# Patient Record
Sex: MALE | Race: WHITE | NOT HISPANIC OR LATINO | Employment: OTHER | URBAN - METROPOLITAN AREA
[De-identification: names, ages, dates, MRNs, and addresses within clinical notes are randomized per-mention and may not be internally consistent; named-entity substitution may affect disease eponyms.]

---

## 2024-07-30 ENCOUNTER — EDUCATION (OUTPATIENT)
Dept: ORTHOPEDIC SURGERY | Facility: HOSPITAL | Age: 74
End: 2024-07-30
Payer: MEDICARE

## 2024-07-30 NOTE — GROUP NOTE
In addition to the group class activities, Jose Garrett had the following lab work completed:  No orders of the defined types were placed in this encounter.      A new History and Physical was not completed.    This class lasted approximately 2 hours. The nurse instructor covered the following topics:  Overview of joint replacement surgery.  Instructions for at-home preparation for surgery (included below).  Expectations before and after surgery including hospital stay.  Discharge planning and home care options.  Physical therapy overview and review of at-home exercises.    Thank you for attending our Joint Replacement class today in preparation for your upcoming surgery.  Topics discussed include:    MyChart Enrollment  Communication with Care Team  My Chart is the best form of communication to reach all of your caregivers  You can send messages to specific care givers, or a care team  Continued Education  You will be enrolled in a Total Joint Replacement care plan to receive additional education before and after surgery  You can review a short recording of the class content  Access to Medical Records  You can access test results, office notes, appointments, etc.  You can connect to other healthcare systems who use [employer] ([facility], [facility], [facility], etc.)  [place]  Program Information  Consent to Enroll    Background/Understanding of Joint Replacement Surgery  Potential for same day discharge  Any questions or concerns to be directed to the surgeon's office    How to Prepare for Surgery  Use of Nicotine Products/Smoking  Stop several weeks before surgery  Such products slow down the healing process and increase risk of post-op infection and complications  Clearance for Surgery  Medical Clearance by Specialists  Dental Clearance  Cracked/Broken/Loose teeth left untreated may postpone surgery  The importance of post-op antibiotics for dental visits per surgeon protocol  Preadmission Testing  **Potential for  postponed surgery if appropriate clearance is not obtained  Medication Instruction  Follow instructions provided by the doctor who prescribes your medication (typically, but not limited to cardiologist)  Preadmission testing will provide additional instructions during your appointment on what to stop and what to take as you get closer to surgery  For clarification of these instructions, please call preadmission testing directly - [phone]  Tips for Preparing the home for discharge from the hospital  Care Partner  Requirement for surgery, the patient must have a plan to have help at home  Potential for postponed surgery if plan for home support cannot be established  How the care partner can help after surgery  CHG Body Wash/Mouth Wash  Follow the instructions given at preadmission testing  Body wash is to be used on the body and hair for 5 washes  Mouthwash is to be used the night before and morning of surgery  **This is a system-wide protocol developed by infectious disease professionals, we will not alter our recommendations for those with sensitive skin or those who have special hair needs.  Please follow the instructions as they are written as this will provide the best infection prevention measures for surgery.  Should you have an allergy to one of the products, please discuss with your preadmission team**    What to Expect in the Hospital/At Home  Morning of Surgery NPO Guidelines  Nothing to eat after midnight  Water can be consumed up to 2 hours prior to arrival  Surgical and Post-Surgical Care Team  Surgical Team  Anesthesia Team  Nursing  Physical Therapy  Care Coordinating  Pharmacy  Hospital Arrival Instructions  Arrive at the time provided to you  Consider traffic patterns (rush-hour) based on arrival time  Have arrangements made for a ride home  If discharging same day, care partner should remain at the hospital  Recovering after Surgery  Recovery Room - Visitors are not brought back  Transition to  hospital room - 2nd Floor, Visitors will be directed to your room  The presence of and strategies for controlling surgical pain and swelling  The importance of early mobility  Side effects after surgery  What to expect if staying overnight    Discharge Planning  The intended plan for discharge will be for patients to discharge home  All patients require a care partner (family, friend, neighbor, etc.) to stay with the patient for the first few nights after surgery  The inability to secure help at home will postpone surgery  Home Care Services set up per surgeon order  Physical Therapy  Occupational Therapy  **If desired, private duty care can be arranged by the patient ahead of time**  Outpatient Physical Therapy per surgeon order    Recovering at Home  Wound Care  Follow wound care instructions found in your discharge paperwork  Bandage is water-resistant and you may shower with the bandage  Do not scrub directly over the bandage  Do not submerge in water until cleared (bathtub, hot tub, pool, etc.)    Post-Op Risk Prevention  Infection Prevention  Promptly seek treatment for any infections post-operatively  Routine dental visits must be postponed for 3 months after surgery  Your surgeon may require antibiotics prior to future dental visits  Any concerns for infection not related directly to the knee or the hip should be managed by your primary care provider  Blood Clots  Be sure to complete the course of blood thinning medication as prescribed by your surgeon  Movement every 1-2 hours during the day is encouraged to prevent blood clots  Monitor for signs of blood clots  Wear compression stockings as prescribed by your surgeon  Constipation  Constipation is common following surgery  Drink plenty of fluids  Take stool softener/laxative as prescribed by your surgeon  Move around frequently  Eat foods high in fiber  Fall Prevention  Prepare home ahead of time to clear space to move with walker  Remove throw rugs and  electrical cords from walkways  Install railings near any stairways with more than 2 steps  Use night lights for increased visibility at night  Continue to use your assistive device until cleared by surgeon or physical therapy  Dislocation Prevention - Not all procedures will have dislocation precautions  Follow dislocation precautions provided by your surgeon  It is OK to resume sexual activity about 6 weeks following surgery  Be sure to follow any dislocation precautions assigned    Durable Medical Equipment  Cold Therapy  Breg Cold Therapy Machines  Ice/Gel Packs  Assistive Devices  Folding Walker with Wheels (in the front only)  No Rollators  Crutches if approved by Physical Therapy and Surgeon after surgery  Hip Kits  Raised Toilet Seats  Additional Compression Stockings    Joint Preservation  Healthy Activities when Cleared  Walking  Swimming  Bike Riding  Activities to Avoid  Refrain from repetitive motions which have a high impact on the joint  Gradual Progression  Progress activity slowly, listen to your body  Common Findings - NORMAL after surgery  Clicking/Grinding  Numbness near incision    Physical Therapy  Prehabilitation exercises  START TODAY ON BOTH LEGS  Surgery Specific Precautions  Follow surgery specific precautions found in your discharge paperwork    Follow-Up Visit  All patients will see their surgeon for a follow up visit after surgery  The visit may range from 2-6 weeks after surgery and is surgeon specific      Please don't hesitate to reach out if you have any additional questions or concerns.    Hina Granados MBA, BSN, RN-BC  MINA Foley, RN  [facility]  [facility]   [phone]

## 2024-08-08 ENCOUNTER — HOSPITAL ENCOUNTER (OUTPATIENT)
Dept: RADIOLOGY | Facility: HOSPITAL | Age: 74
Discharge: HOME | End: 2024-08-08
Payer: MEDICARE

## 2024-08-08 ENCOUNTER — LAB (OUTPATIENT)
Dept: LAB | Facility: LAB | Age: 74
End: 2024-08-08
Payer: MEDICARE

## 2024-08-08 ENCOUNTER — PRE-ADMISSION TESTING (OUTPATIENT)
Dept: PREADMISSION TESTING | Facility: HOSPITAL | Age: 74
End: 2024-08-08
Payer: MEDICARE

## 2024-08-08 VITALS
SYSTOLIC BLOOD PRESSURE: 138 MMHG | HEIGHT: 66 IN | RESPIRATION RATE: 16 BRPM | BODY MASS INDEX: 24.59 KG/M2 | TEMPERATURE: 97.9 F | HEART RATE: 71 BPM | DIASTOLIC BLOOD PRESSURE: 64 MMHG | OXYGEN SATURATION: 99 % | WEIGHT: 153 LBS

## 2024-08-08 DIAGNOSIS — M25.561 PAIN IN RIGHT KNEE: ICD-10-CM

## 2024-08-08 DIAGNOSIS — Z01.818 PREOPERATIVE TESTING: ICD-10-CM

## 2024-08-08 DIAGNOSIS — Z01.818 PREOPERATIVE TESTING: Primary | ICD-10-CM

## 2024-08-08 LAB
ALBUMIN SERPL BCP-MCNC: 4.4 G/DL (ref 3.4–5)
ALP SERPL-CCNC: 70 U/L (ref 33–136)
ALT SERPL W P-5'-P-CCNC: 18 U/L (ref 10–52)
ANION GAP SERPL CALC-SCNC: 14 MMOL/L (ref 10–20)
AST SERPL W P-5'-P-CCNC: 20 U/L (ref 9–39)
BASOPHILS # BLD AUTO: 0.07 X10*3/UL (ref 0–0.1)
BASOPHILS NFR BLD AUTO: 0.8 %
BILIRUB SERPL-MCNC: 0.4 MG/DL (ref 0–1.2)
BUN SERPL-MCNC: 28 MG/DL (ref 6–23)
CALCIUM SERPL-MCNC: 9.4 MG/DL (ref 8.6–10.3)
CHLORIDE SERPL-SCNC: 104 MMOL/L (ref 98–107)
CO2 SERPL-SCNC: 25 MMOL/L (ref 21–32)
CREAT SERPL-MCNC: 1.24 MG/DL (ref 0.5–1.3)
EGFRCR SERPLBLD CKD-EPI 2021: 61 ML/MIN/1.73M*2
EOSINOPHIL # BLD AUTO: 0.48 X10*3/UL (ref 0–0.4)
EOSINOPHIL NFR BLD AUTO: 5.6 %
ERYTHROCYTE [DISTWIDTH] IN BLOOD BY AUTOMATED COUNT: 12.6 % (ref 11.5–14.5)
GLUCOSE SERPL-MCNC: 95 MG/DL (ref 74–99)
HCT VFR BLD AUTO: 33.7 % (ref 41–52)
HGB BLD-MCNC: 11.1 G/DL (ref 13.5–17.5)
IMM GRANULOCYTES # BLD AUTO: 0.02 X10*3/UL (ref 0–0.5)
IMM GRANULOCYTES NFR BLD AUTO: 0.2 % (ref 0–0.9)
LYMPHOCYTES # BLD AUTO: 1.41 X10*3/UL (ref 0.8–3)
LYMPHOCYTES NFR BLD AUTO: 16.4 %
MCH RBC QN AUTO: 29.5 PG (ref 26–34)
MCHC RBC AUTO-ENTMCNC: 32.9 G/DL (ref 32–36)
MCV RBC AUTO: 90 FL (ref 80–100)
MONOCYTES # BLD AUTO: 0.77 X10*3/UL (ref 0.05–0.8)
MONOCYTES NFR BLD AUTO: 8.9 %
NEUTROPHILS # BLD AUTO: 5.86 X10*3/UL (ref 1.6–5.5)
NEUTROPHILS NFR BLD AUTO: 68.1 %
NRBC BLD-RTO: ABNORMAL /100{WBCS}
PLATELET # BLD AUTO: 316 X10*3/UL (ref 150–450)
POTASSIUM SERPL-SCNC: 4.6 MMOL/L (ref 3.5–5.3)
PROT SERPL-MCNC: 6.8 G/DL (ref 6.4–8.2)
RBC # BLD AUTO: 3.76 X10*6/UL (ref 4.5–5.9)
SODIUM SERPL-SCNC: 138 MMOL/L (ref 136–145)
WBC # BLD AUTO: 8.6 X10*3/UL (ref 4.4–11.3)

## 2024-08-08 PROCEDURE — 85025 COMPLETE CBC W/AUTO DIFF WBC: CPT

## 2024-08-08 PROCEDURE — 87081 CULTURE SCREEN ONLY: CPT | Mod: BEALAB | Performed by: NURSE PRACTITIONER

## 2024-08-08 PROCEDURE — 99203 OFFICE O/P NEW LOW 30 MIN: CPT | Performed by: NURSE PRACTITIONER

## 2024-08-08 PROCEDURE — 73700 CT LOWER EXTREMITY W/O DYE: CPT | Mod: RT

## 2024-08-08 PROCEDURE — 36415 COLL VENOUS BLD VENIPUNCTURE: CPT

## 2024-08-08 PROCEDURE — 80053 COMPREHEN METABOLIC PANEL: CPT

## 2024-08-08 PROCEDURE — 93005 ELECTROCARDIOGRAM TRACING: CPT

## 2024-08-08 PROCEDURE — 93010 ELECTROCARDIOGRAM REPORT: CPT | Performed by: INTERNAL MEDICINE

## 2024-08-08 RX ORDER — LOSARTAN POTASSIUM 25 MG/1
25 TABLET ORAL DAILY
COMMUNITY

## 2024-08-08 RX ORDER — NAPROXEN 250 MG/1
250 TABLET ORAL 2 TIMES DAILY PRN
COMMUNITY

## 2024-08-08 RX ORDER — METFORMIN HYDROCHLORIDE 500 MG/1
500 TABLET ORAL
COMMUNITY

## 2024-08-08 RX ORDER — AMLODIPINE BESYLATE 5 MG/1
TABLET ORAL DAILY
COMMUNITY

## 2024-08-08 RX ORDER — OMEPRAZOLE 20 MG/1
20 CAPSULE, DELAYED RELEASE ORAL EVERY OTHER DAY
COMMUNITY

## 2024-08-08 RX ORDER — CHLORHEXIDINE GLUCONATE ORAL RINSE 1.2 MG/ML
15 SOLUTION DENTAL AS NEEDED
Qty: 30 ML | Refills: 0 | Status: SHIPPED | OUTPATIENT
Start: 2024-08-08

## 2024-08-08 RX ORDER — FERROUS SULFATE 325(65) MG
65 TABLET, DELAYED RELEASE (ENTERIC COATED) ORAL
COMMUNITY

## 2024-08-08 RX ORDER — CHLORHEXIDINE GLUCONATE 40 MG/ML
SOLUTION TOPICAL DAILY
Qty: 470 ML | Refills: 0 | Status: SHIPPED | OUTPATIENT
Start: 2024-08-08 | End: 2024-08-13

## 2024-08-08 RX ORDER — MOMETASONE FUROATE 220 UG/1
2 INHALANT RESPIRATORY (INHALATION)
COMMUNITY

## 2024-08-08 RX ORDER — ACETAMINOPHEN 500 MG
5000 TABLET ORAL DAILY
COMMUNITY

## 2024-08-08 RX ORDER — ATORVASTATIN CALCIUM 40 MG/1
40 TABLET, FILM COATED ORAL DAILY
COMMUNITY

## 2024-08-08 ASSESSMENT — PAIN SCALES - GENERAL: PAINLEVEL_OUTOF10: 3

## 2024-08-08 ASSESSMENT — PAIN - FUNCTIONAL ASSESSMENT: PAIN_FUNCTIONAL_ASSESSMENT: 0-10

## 2024-08-08 NOTE — CPM/PAT H&P
Patient is an alert and oriented 73 year old male scheduled for a  Open Total Knee Arthroplasty  on 8/28/2024 with Dr. Yap for  Primary Osteoarthritis Right Knee.  The patient reports intermittent dull knee pain 3/10.  He states walking exacerbates the pain.  Aleve helps.  He uses a cane to help with ambulation.  He states his knee swells but does not give out.  He wears a knee brace which helps.    PMHX includes CKD 3a, DM II, BPH w/ Urinary Retention, SIADH, Asthma,  Hyponatremia, HTN    Presents to [facility] today for perioperative risk stratification and optimization.      Review of Systems   Constitutional: Negative for chills, decreased appetite, diaphoresis, fever and malaise/fatigue.   Eyes:  Negative for blurred vision and double vision.   Cardiovascular:  Negative for chest pain, claudication, cyanosis, dyspnea on exertion, irregular heartbeat, leg swelling, near-syncope and palpitations.   Respiratory:  Negative for cough, hemoptysis, shortness of breath and wheezing.    Endocrine: Negative for cold intolerance, heat intolerance, polydipsia, polyphagia and polyuria.   Gastrointestinal:  Negative for abdominal pain, constipation, diarrhea, dysphagia, nausea and vomiting.   Genitourinary:  Negative for bladder incontinence, dysuria, hematuria, incomplete emptying, nocturia, pelvic pain and urgency.   Neurological:  Negative for headaches, light-headedness, paresthesias, sensory change and weakness.   Psychiatric/Behavioral:  Negative for altered mental status.       Vitals and nursing note reviewed.     Physical exam  Constitutional:       Appearance: Normal appearance. He is normal weight.   HENT:      Head: Normocephalic and atraumatic.      Mouth/Throat:      Mouth: Mucous membranes are moist.      Pharynx: Oropharynx is clear.   Eyes:      Extraocular Movements: Extraocular movements intact.      Conjunctiva/sclera: Conjunctivae normal.      Pupils: Pupils are equal, round, and reactive to light.    Cardiovascular:      Rate and Rhythm: Normal rate and regular rhythm.      Pulses: Normal pulses.      Heart sounds: Normal heart sounds.      No audible carotid bruit  Pulmonary:      Effort: Pulmonary effort is normal.      Breath sounds: Normal breath sounds.   Abdominal:      General: Abdomen is flat. Bowel sounds are normal.      Palpations: Abdomen is soft.   Musculoskeletal:      Cervical back: Normal range of motion and neck supple.   Skin:     General: Skin is warm and dry.      Capillary Refill: Capillary refill takes less than 2 seconds.   Neurological:      General: No focal deficit present.      Mental Status: He is alert and oriented to person, place, and time. Mental status is at baseline.   Psychiatric:         Mood and Affect: Mood normal.         Behavior: Behavior normal.         Thought Content: Thought content normal.         Judgment: Judgment normal.     Vitals and nursing note reviewed. Physical exam within normal limits.     Assessment & Plan:    Neuro:  No diagnosis or significant findings on chart review or clinical presentation and evaluation.     HEENT/Airway:  No diagnosis or significant findings on chart review or clinical presentation and evaluation.     STOP-BANG Score 4    Mallampati::  II    TM distance::  >3 FB    Neck ROM::  Full  Mouth opening 3   Has dental crowns    Cardiovascular:  Hypertension  Managed with amlodipine 5mg daily  Managed with lasix 20mg daily  Managed w/ losartan 100mg daily  METS: 6.27  RCRI: 0 points, 3.9%  risk for postoperative MACE   REYNA: 0.2% risk for postoperative MACE  EKG - NSR rate of 64    Pulmonary:  Asthma  Managed with mometasone inhaler    ARISCAT: <26 points, 1.6% risk of in-hospital postoperative pulmonary complication  PRODIGY: High risk for opioid induced respiratory depression  Smoking History-former smoker quit 45 yrs ago smoked 1 ppd x 10 years  Discussed smoking cessation and deep breathing handout given    Renal:    BPH w/ Urianry  Retention  Managed with tamsulosin 0.4mg daily  Follow up with urology as scheduled    CKD 3a  Hyponatremia  Follow up with [facility] as scheduled  Will check cmp    The patient is at increased risk of perioperative renal complications secondary to age>/= 54, male sex, HTN, and diabetes. Preventative measures include  CMP ordered     Endocrine:  Diabetes Mellitus II  Last A1c 6.0 on 6/19/2024  Managed with metformin 500mg bid    SIADH  Stable   Follow up with pcp as scheduled    Hematologic:  CBC ordered   Caprini Score 8, patient at High risk for postoperative DVT. Pt supplied education/VTE handout    Gastrointestinal:   No diagnosis or significant findings on chart review or clinical presentation and evaluation.   Alcohol use-denies use  Drug use-denies    Infectious disease:   No diagnosis or significant findings on chart review or clinical presentation and evaluation.   Prescription provided for CHG body wash and dental rinse. CHG use instructions reviewed and provided to patient.  Staph screen collectedby nursing    Musculoskeletal:   Primary Osteoarthritis Right Knee  Open Total Knee Arthroplasty    JHFRAT score 8 moderate falls risk    Anesthesia:  No anesthesia complications  Family history of anesthesia complications none    Labs & Imaging ordered:  CBC, CMP, MRSA,  EKG  Nickel/metal allergy-none  Shellfish allergy-none    Overall, patient at low risk for the scheduled surgery. Discussed with patient medication instructions, NPO guidelines, and any questions or concerns. Patient needs no further workup prior to preceding with elective surgery.     Face to face time-30 minutes  Janiya Fish CNP

## 2024-08-08 NOTE — PREPROCEDURE INSTRUCTIONS
Medication List            Accurate as of August 8, 2024 10:28 AM. Always use your most recent med list.                amLODIPine 5 mg tablet  Commonly known as: Norvasc  Medication Adjustments for Surgery: Take morning of surgery with sip of water, no other fluids     Asmanex Twisthaler 220 mcg/ actuation (14) inhaler  Generic drug: mometasone  Notes to patient: Use as directed     atorvastatin 40 mg tablet  Commonly known as: Lipitor  Medication Adjustments for Surgery: Continue until night before surgery     * chlorhexidine 0.12 % solution  Commonly known as: Peridex  Use 15 mL in the mouth or throat if needed (pre operative 15ml swish and spit the night befor and morning of surgery) for up to 2 doses.  Notes to patient: Use as directed     * chlorhexidine 4 % external liquid  Commonly known as: Hibiclens  Apply topically once daily for 5 days.  Notes to patient: Use as directed      cholecalciferol 5,000 Units tablet  Commonly known as: Vitamin D-3  Medication Adjustments for Surgery: Stop 7 days before surgery  Notes to patient: Last dose on 8/21/2024     ferrous sulfate 325 (65 Fe) MG EC tablet  Medication Adjustments for Surgery: Stop 1 day before surgery  Notes to patient: Last dose on 8/27/2024     losartan 25 mg tablet  Commonly known as: Cozaar  Medication Adjustments for Surgery: Stop 1 day before surgery  Notes to patient: Last dose am on 8/27/2024 do not take day of surgery     metFORMIN 500 mg tablet  Commonly known as: Glucophage  Medication Adjustments for Surgery: Stop 1 day before surgery  Notes to patient: Take evening dose on 8/27/2024 do not take in am on 8/28/2024     naproxen 250 mg tablet  Commonly known as: Naprosyn  Medication Adjustments for Surgery: Stop 7 days before surgery  Notes to patient: Last dose on 8/21/2024     omeprazole 20 mg DR capsule  Commonly known as: PriLOSEC  Medication Adjustments for Surgery: Take morning of surgery with sip of water, no other fluids           *  This list has 2 medication(s) that are the same as other medications prescribed for you. Read the directions carefully, and ask your doctor or other care provider to review them with you.                NPO Instructions:    Do not eat any food after midnight the night before your surgery/procedure.    Additional Instructions:     Seven/Six Days before Surgery:  We have sent a prescription for Hibiclens soap to your preferred pharmacy.  If you have not already, Please  your prescription and start using five days before surgery.  Follow the instruction sheet provided to you at your CPM/PAT appointment.  Review your medication instructions, stop indicated medications  Five Days before Surgery:  Review your medication instructions, stop indicated medications  Begin using your Hibiclens  Three Days before Surgery:  Review your medication instructions, stop indicated medications  The Day before Surgery:  Review your medication instructions, stop indicated medications  You will be contacted regarding the time of your arrival to facility and surgery time  Do not eat any food after Midnight  Day of Surgery:  Review your medication instructions, take indicated medications  If you have diabetes, please check your fasting blood sugar upon awakening.  If fasting blood sugar is <80 mg/dl, drink 100 ml of apple juice, time limit of 2 hours before  Wear  comfortable loose fitting clothing  Do not use moisturizers, creams, lotions or perfume  All jewelry and valuables should be left at home  CONTACT SURGEON'S OFFICE IF YOU DEVELOP:  * Fever = 100.4 F   * New respiratory symptoms (e.g. cough, shortness of breath, respiratory distress, sore throat)  * Recent loss of taste or smell  *Flu like symptoms such as headache, fatigue or gastrointestinal symptoms  * You develop any open sores, shingles, burning or painful urination   AND/OR:  * You no longer wish to have the surgery.  * Any other personal circumstances change that may  lead to the need to cancel or defer this surgery.  *You were admitted to any hospital within one week of your planned procedure.    SMOKING:  *Quitting smoking can make a huge difference to your health and recovery from surgery.    *If you need help with quitting, call 4-403-QUIT-NOW.    THE DAY BEFORE SURGERY:  *Do not eat any food after midnight the night before your surgery.     SURGICAL TIME:  *You will be contacted between 2 p.m. and 3 p.m. the business day before your surgery with your arrival time.  *If you haven't received a call by 3pm, call [phone]  *Scheduled surgery times may change and you will be notified if this occurs-check your personal voicemail for any updates.    ON THE MORNING OF SURGERY:  *Wear comfortable, loose fitting clothing.   *Do not use moisturizers, creams, lotions or perfume.  *All jewelry and valuables should be left at home.  *Prosthetic devices such as contact lenses, hearing aids, dentures, eyelash extensions, hairpins and body piercing must be removed before surgery.    BRING WITH YOU:  *Photo ID and insurance card  *Current list of medications and allergies  *Pacemaker/Defibrillator/Heart stent cards  *CPAP machine and mask  *Slings/splints/crutches  *Copy of your complete Advanced Directive/DHPOA-if applicable  *Neurostimulator implant remote    PARKING AND ARRIVAL:  *Check in at the Main Entrance desk and let them know you are here for surgery.    IF YOU ARE HAVING OUTPATIENT/SAME DAY SURGERY:  *A responsible adult MUST accompany you at the time of discharge and stay with you for 24 hours after your surgery.  *You may NOT drive yourself home after surgery.  *You may use a taxi or ride sharing service ([employer], [employer]) to return home ONLY if you are accompanied by a friend or family member.  *Instructions for resuming your medications will be provided by your surgeon.    Thank you for coming to Pre Admission testing.     If I have prescribe medication please don't forget to   at your pharmacy.     Any questions about today's visit call [phone] and leave a message in the general mailbox.    Patient instructed to ambulate as soon as possible postoperatively to decrease thromboembolic risk.    KYRIE Maxwell-CNP    Thank you for visiting [facility].  If you have any changes to your health condition, please call the surgeons office to alert them and give them details of your symptoms.      Preoperative Fasting Guidelines    Why must I stop eating and drinking near surgery time?  With sedation, food or liquid in your stomach can enter your lungs causing serious complications  Increases nausea and vomiting    Additional Instructions:     The Day before Surgery:  -Review your medication instructions, stop indicated medications  -You will be contacted in the evening regarding the time of your arrival to facility and surgery time    Day of Surgery:  -Review your medication instructions, take indicated medications  -Wear comfortable loose fitting clothing  -Do not use moisturizers, creams, lotions or perfume  -All jewelry and valuables should be left at home      Preoperative Brain Exercises    What are brain exercises?  A brain exercise is any activity that engages your thinking (cognitive) skills.    What types of activities are considered brain exercises?  Jigsaw puzzles, crossword puzzles, word jumble, memory games, word search, and many more.  Many can be found free online or on your phone via a mobile misti.    Why should I do brain exercises before my surgery?  More recent research has shown brain exercise before surgery can lower the risk of postoperative delirium (confusion) which can be especially important for older adults.  Patients who did brain exercises for 5 to 10 hours the days before surgery, cut their risk of postoperative delirium in half up to 1 week after surgery.        [facility]    Preoperative Deep  Breathing Exercises    Why it is important to do deep breathing exercises before my surgery?  Deep breathing exercises strengthen your breathing muscles.  This helps you to recover after your surgery and decreases the chance of breathing complications.      How are the deep breathing exercises done?  Sit straight with your back supported.  Breathe in deeply and slowly through your nose. Your lower rib cage should expand and your abdomen may move forward.  Hold that breath for 3 to 5 seconds.  Breathe out through pursed lips, slowly and completely.  Rest and repeat 10 times every hour while awake.  Rest longer if you become dizzy or lightheaded.        [facility]    Preoperative Deep Breathing Exercises    Why it is important to do deep breathing exercises before my surgery?  Deep breathing exercises strengthen your breathing muscles.  This helps you to recover after your surgery and decreases the chance of breathing complications.      How are the deep breathing exercises done?  Sit straight with your back supported.  Breathe in deeply and slowly through your nose. Your lower rib cage should expand and your abdomen may move forward.  Hold that breath for 3 to 5 seconds.  Breathe out through pursed lips, slowly and completely.  Rest and repeat 10 times every hour while awake.  Rest longer if you become dizzy or lightheaded.      Patient Information: Incentive Spirometer  What is an incentive spirometer?  An incentive spirometer is a device used before and after surgery to “exercise” your lungs.  It helps you to take deeper breaths to expand your lungs.  Below is an example of a basic incentive spirometer.  The device you receive may differ slightly but they all function the same.    Why do I need to use an incentive spirometer?  Using your incentive spirometer prepares your lungs for surgery and helps prevent lung problems after surgery.  How do I use my incentive spirometer?  When you're  using your incentive spirometer, make sure to breathe through your mouth. If you breathe through your nose, the incentive spirometer won't work properly. You can hold your nose if you have trouble.  If you feel dizzy at any time, stop and rest. Try again at a later time.  Follow the steps below:  Set up your incentive spirometer, expand the flexible tubing and connect to the outlet.  Sit upright in a chair or bed. Hold the incentive spirometer at eye level.   Put the mouthpiece in your mouth and close your lips tightly around it. Slowly breathe out (exhale) completely.  Breathe in (inhale) slowly through your mouth as deeply as you can. As you take a breath, you will see the piston rise inside the large column. While the piston rises, the indicator should move upwards. It should stay in between the 2 arrows (see Figure).  Try to get the piston as high as you can, while keeping the indicator between the arrows.   If the indicator doesn't stay between the arrows, you're breathing either too fast or too slow.  When you get it as high as you can, hold your breath for 10 seconds, or as long as possible. While you're holding your breath, the piston will slowly fall to the base of the spirometer.  Once the piston reaches the bottom of the spirometer, breathe out slowly through your mouth. Rest for a few seconds.  Repeat 10 times. Try to get the piston to the same level with each breath.  Repeat every hour while awake  You can carefully clean the outside of the mouthpiece with an alcohol wipe or soap and water.      Patient and Family Education       Ways You Can Help Prevent Blood Clots             This handout explains some simple things you can do to help prevent blood clots.      Blood clots are blockages that can form in the body's veins. When a blood clot forms in your deep veins, it may be called a deep vein thrombosis, or DVT for short. Blood clots can happen in any part of the body where blood flows, but they are  most common in the arms and legs. If a piece of a blood clot breaks free and travels to the lungs, it is called a pulmonary embolus (PE). A PE can be a very serious problem.         Being in the hospital or having surgery can raise your chances of getting a blood clot because you may not be well enough to move around as much as you normally do.         Ways you can help prevent blood clots in the hospital         Wearing SCDs. SCDs stands for Sequential Compression Devices.   SCDs are special sleeves that wrap around your legs  They attach to a pump that fills them with air to gently squeeze your legs every few minutes.   This helps return the blood in your legs to your heart.   SCDs should only be taken off when walking or bathing.   SCDs may not be comfortable, but they can help save your life.               Wearing compression stockings - if your doctor orders them. These special snug fitting stockings gently squeeze your legs to help blood flow.       Walking. Walking helps move the blood in your legs.   If your doctor says it is ok, try walking the halls at least   5 times a day. Ask us to help you get up, so you don't fall.      Taking any blood thinning medicines your doctor orders.        Page 1 of 2     [facility]; 3/23   Ways you can help prevent blood clots at home       Wearing compression stockings - if your doctor orders them. ? Walking - to help move the blood in your legs.       Taking any blood thinning medicines your doctor orders.      Signs of a blood clot or PE      Tell your doctor or nurse know right away if you have of the problems listed below.    If you are at home, seek medical care right away. Call 911 for chest pain or problems breathing.               Signs of a blood clot (DVT) - such as pain,  swelling, redness or warmth in your arm or leg      Signs of a pulmonary embolism (PE) - such as chest     pain or feeling short of breath   Patient Information: Pre-Operative  Infection Prevention Measures     Why did I have my nose, under my arms, and groin swabbed?  The purpose of the swab is to identify Staphylococcus aureus inside your nose or on your skin.  The swab was sent to the laboratory for culture.  A positive swab/culture for Staphylococcus aureus is called colonization or carriage.      What is Staphylococcus aureus?  Staphylococcus aureus, also known as “staph”, is a germ found on the skin or in the nose of healthy people.  Sometimes Staphylococcus aureus can get into the body and cause an infection.  This can be minor (such as pimples, boils, or other skin problems).  It might also be serious (such as a blood infection, pneumonia, or a surgical site infection).    What is Staphylococcus aureus colonization or carriage?  Colonization or carriage means that a person has the germ but is not sick from it.  These bacteria can be spread on the hands or when breathing or sneezing.    How is Staphylococcus aureus spread?  It is most often spread by close contact with a person or item that carries it.    What happens if my culture is positive for Staphylococcus aureus?  Your doctor/medical team will use this information to guide any antibiotic treatment which may be necessary.  Regardless of the culture results, we will clean the inside of your nose with a betadine swab just before you have your surgery.      Will I get an infection if I have Staphylococcus aureus in my nose or on my skin?  Anyone can get an infection with Staphylococcus aureus.  However, the best way to reduce your risk of infection is to follow the instructions provided to you for the use of your CHG soap and dental rinse.        Patient Information: Oral/Dental Rinse    What is oral/dental rinse?   It is a mouthwash. It is a way of cleaning the mouth with a germ-killing solution before your surgery.  The solution contains chlorhexidine, commonly known as CHG.   It is used inside the mouth to kill a bacteria  known as Staphylococcus aureus.  Let your doctor know if you are allergic to Chlorhexidine.    We have sent a prescription for CHG 0.12% mouthwash to your preferred pharmacy.  If you have not already, Please  your prescription and start using the day before before surgery.  Follow the instruction sheet provided to you at your CPM/PAT appointment. Please contact [facility] if you do not receive your CHG mouthwash prescription.     Why do I need to use CHG oral/dental rinse?  The CHG oral/dental rinse helps to kill a bacteria in your mouth known as Staphylococcus aureus.     This reduces the risk of infection at the surgical site.      Using your CHG oral/dental rinse  STEPS:  Use your CHG oral/dental rinse after you brush your teeth the night before (at bedtime) and the morning of your surgery.  Follow all directions on your prescription label.    Use the cap on the container to measure 15ml   Swish (gargle if you can) the mouthwash in your mouth for at least 30 seconds, (do not swallow) and spit out  After you use your CHG rinse, do not rinse your mouth with water, drink or eat.  Please refer to the prescription label for the appropriate time to resume oral intake      What side effects might I have using the CHG oral/dental rinse?  CHG rinse will stick to plaque on the teeth.  Brush and floss just before use.  Teeth brushing will help avoid staining of plaque during use.      Patient Information: Home Preoperative Antibacterial Shower      What is a home preoperative antibacterial shower?  This shower is a way of cleaning the skin with a germ-killing solution before surgery.  The solution contains chlorhexidine, commonly known as CHG.  CHG is a skin cleanser with germ-killing ability.  Let your doctor know if you are allergic to chlorhexidine.    Why do I need to take a preoperative antibacterial shower?  Skin is not sterile.  It is best to try to make your skin as free of germs as possible before surgery.   Proper cleansing with a germ-killing soap before surgery can lower the number of germs on your skin.  This helps to reduce the risk of infection at the surgical site.  Following the instructions listed below will help you prepare your skin for surgery.      How do I use the solution?  Steps:  Begin using your CHG soap 5 days before your scheduled surgery on  8/24/2024  First, wash and rinse your hair using the CHG soap. Keep CHG soap away from ear canals and eyes.  Rinse completely, do not condition.  Hair extensions should be removed.  Wash your face with your normal soap and rinse.    Apply the CHG solution to a clean wet washcloth.  Turn the water off or move away from the water spray to avoid premature rinsing of the CHG soap as you are applying.   Firmly lather your entire body from the neck down.  Do not use on your face.  Pay special attention to the area(s) where your incision(s) will be located unless they are on your face.  Avoid scrubbing your skin too hard.  The important point is to have the CHG soap sit on your skin for 3 minutes.    When the 3 minutes are up, turn on the water and rinse the CHG solution off your body completely.   DO NOT wash with regular soap after you have used the CHG soap solution  Pat yourself dry with a clean, freshly-laundered towel.  DO NOT apply powders, deodorants, or lotions.  Dress in clean, freshly laundered nightclothes.    Be sure to sleep with clean, freshly laundered sheets.  Be aware that CHG will cause stains on fabrics; if you wash them with bleach after use.  Rinse your washcloth and other linens that have contact with CHG completely.  Use only non-chlorine detergents to launder the items used.   The morning of surgery is the fifth day.  Repeat the above steps and dress in clean comfortable clothing     Whom should I contact if I have any questions regarding the use of CHG soap?  Call the [facility] at [phone] if you have any questions.

## 2024-08-08 NOTE — H&P (VIEW-ONLY)
Patient is an alert and oriented 73 year old male scheduled for a  Open Total Knee Arthroplasty  on 8/28/2024 with Dr. Yap for  Primary Osteoarthritis Right Knee.  The patient reports intermittent dull knee pain 3/10.  He states walking exacerbates the pain.  Aleve helps.  He uses a cane to help with ambulation.  He states his knee swells but does not give out.  He wears a knee brace which helps.    PMHX includes CKD 3a, DM II, BPH w/ Urinary Retention, SIADH, Asthma,  Hyponatremia, HTN    Presents to [facility] today for perioperative risk stratification and optimization.      Review of Systems   Constitutional: Negative for chills, decreased appetite, diaphoresis, fever and malaise/fatigue.   Eyes:  Negative for blurred vision and double vision.   Cardiovascular:  Negative for chest pain, claudication, cyanosis, dyspnea on exertion, irregular heartbeat, leg swelling, near-syncope and palpitations.   Respiratory:  Negative for cough, hemoptysis, shortness of breath and wheezing.    Endocrine: Negative for cold intolerance, heat intolerance, polydipsia, polyphagia and polyuria.   Gastrointestinal:  Negative for abdominal pain, constipation, diarrhea, dysphagia, nausea and vomiting.   Genitourinary:  Negative for bladder incontinence, dysuria, hematuria, incomplete emptying, nocturia, pelvic pain and urgency.   Neurological:  Negative for headaches, light-headedness, paresthesias, sensory change and weakness.   Psychiatric/Behavioral:  Negative for altered mental status.       Vitals and nursing note reviewed.     Physical exam  Constitutional:       Appearance: Normal appearance. He is normal weight.   HENT:      Head: Normocephalic and atraumatic.      Mouth/Throat:      Mouth: Mucous membranes are moist.      Pharynx: Oropharynx is clear.   Eyes:      Extraocular Movements: Extraocular movements intact.      Conjunctiva/sclera: Conjunctivae normal.      Pupils: Pupils are equal, round, and reactive to light.    Cardiovascular:      Rate and Rhythm: Normal rate and regular rhythm.      Pulses: Normal pulses.      Heart sounds: Normal heart sounds.      No audible carotid bruit  Pulmonary:      Effort: Pulmonary effort is normal.      Breath sounds: Normal breath sounds.   Abdominal:      General: Abdomen is flat. Bowel sounds are normal.      Palpations: Abdomen is soft.   Musculoskeletal:      Cervical back: Normal range of motion and neck supple.   Skin:     General: Skin is warm and dry.      Capillary Refill: Capillary refill takes less than 2 seconds.   Neurological:      General: No focal deficit present.      Mental Status: He is alert and oriented to person, place, and time. Mental status is at baseline.   Psychiatric:         Mood and Affect: Mood normal.         Behavior: Behavior normal.         Thought Content: Thought content normal.         Judgment: Judgment normal.     Vitals and nursing note reviewed. Physical exam within normal limits.     Assessment & Plan:    Neuro:  No diagnosis or significant findings on chart review or clinical presentation and evaluation.     HEENT/Airway:  No diagnosis or significant findings on chart review or clinical presentation and evaluation.     STOP-BANG Score 4    Mallampati::  II    TM distance::  >3 FB    Neck ROM::  Full  Mouth opening 3   Has dental crowns    Cardiovascular:  Hypertension  Managed with amlodipine 5mg daily  Managed with lasix 20mg daily  Managed w/ losartan 100mg daily  METS: 6.27  RCRI: 0 points, 3.9%  risk for postoperative MACE   REYNA: 0.2% risk for postoperative MACE  EKG - NSR rate of 64    Pulmonary:  Asthma  Managed with mometasone inhaler    ARISCAT: <26 points, 1.6% risk of in-hospital postoperative pulmonary complication  PRODIGY: High risk for opioid induced respiratory depression  Smoking History-former smoker quit 45 yrs ago smoked 1 ppd x 10 years  Discussed smoking cessation and deep breathing handout given    Renal:    BPH w/ Urianry  Retention  Managed with tamsulosin 0.4mg daily  Follow up with urology as scheduled    CKD 3a  Hyponatremia  Follow up with [facility] as scheduled  Will check cmp    The patient is at increased risk of perioperative renal complications secondary to age>/= 54, male sex, HTN, and diabetes. Preventative measures include  CMP ordered     Endocrine:  Diabetes Mellitus II  Last A1c 6.0 on 6/19/2024  Managed with metformin 500mg bid    SIADH  Stable   Follow up with pcp as scheduled    Hematologic:  CBC ordered   Caprini Score 8, patient at High risk for postoperative DVT. Pt supplied education/VTE handout    Gastrointestinal:   No diagnosis or significant findings on chart review or clinical presentation and evaluation.   Alcohol use-denies use  Drug use-denies    Infectious disease:   No diagnosis or significant findings on chart review or clinical presentation and evaluation.   Prescription provided for CHG body wash and dental rinse. CHG use instructions reviewed and provided to patient.  Staph screen collectedby nursing    Musculoskeletal:   Primary Osteoarthritis Right Knee  Open Total Knee Arthroplasty    JHFRAT score 8 moderate falls risk    Anesthesia:  No anesthesia complications  Family history of anesthesia complications none    Labs & Imaging ordered:  CBC, CMP, MRSA,  EKG  Nickel/metal allergy-none  Shellfish allergy-none    Overall, patient at low risk for the scheduled surgery. Discussed with patient medication instructions, NPO guidelines, and any questions or concerns. Patient needs no further workup prior to preceding with elective surgery.     Face to face time-30 minutes  Janiya Fish CNP

## 2024-08-09 LAB
ATRIAL RATE: 64 BPM
P AXIS: 18 DEGREES
P OFFSET: 160 MS
P ONSET: 114 MS
PR INTERVAL: 194 MS
Q ONSET: 211 MS
QRS COUNT: 11 BEATS
QRS DURATION: 90 MS
QT INTERVAL: 388 MS
QTC CALCULATION(BAZETT): 400 MS
QTC FREDERICIA: 396 MS
R AXIS: -7 DEGREES
T AXIS: 12 DEGREES
T OFFSET: 405 MS
VENTRICULAR RATE: 64 BPM

## 2024-08-10 LAB — STAPHYLOCOCCUS SPEC CULT: NORMAL

## 2024-08-16 ENCOUNTER — TELEPHONE (OUTPATIENT)
Dept: ORTHOPEDIC SURGERY | Facility: HOSPITAL | Age: 74
End: 2024-08-16
Payer: MEDICARE

## 2024-08-16 NOTE — TELEPHONE ENCOUNTER
Thank you for taking my call today.  All questions were answered at the time of the call, but please feel free to reach out to me via [employer] or phone, [phone], with any new questions or concerns.       We confirmed that you opted to enroll in our [id number] program so your discharge prescriptions will be available to take home at the time of discharge.  Please bring any prescription insurance coverage with you on the morning of surgery so that we can enter the information into our system.     We confirmed that your plan would be to Discharge Home same day (Rapid Recovery).    We confirmed that you have DME needed for recovery.     Use the provided body wash for 4 days before surgery and complete a 5th shower on the morning of surgery, this includes your body and hair.  Follow the directions as provided during preadmission testing.  The mouth wash will be used the night before and the morning of surgery.       As a reminder, if you do not hear from our team, please call [phone] between 2pm and 3pm the business day before your surgery to confirm your arrival time and details.        Please don't hesitate to reach out with additional questions or concerns.    Hina Granados MBA, BSN, RN-BC  DELFINO FoleyN, RN  [facility] Nav[facility]  [facility]  [phone]

## 2024-08-22 ENCOUNTER — TELEPHONE (OUTPATIENT)
Dept: ORTHOPEDIC SURGERY | Facility: HOSPITAL | Age: 74
End: 2024-08-22
Payer: MEDICARE

## 2024-08-23 NOTE — TELEPHONE ENCOUNTER
Thank you for taking my call today.  All questions were answered at the time of the call, but please feel free to reach out to me via [employer] or phone, [phone], with any new questions or concerns.       We confirmed that you opted to enroll in our [id number] program so your discharge prescriptions will be available to take home at the time of discharge.  Please bring any prescription insurance coverage with you on the morning of surgery so that we can enter the information into our system.     We confirmed that your plan would be to Discharge Home same day (Rapid Recovery).    We confirmed that you have DME needed for recovery.     Use the provided body wash for 4 days before surgery and complete a 5th shower on the morning of surgery, this includes your body and hair.  Follow the directions as provided during preadmission testing.  The mouth wash will be used the night before and the morning of surgery.       As a reminder, if you do not hear from our team, please call [phone] between 2pm and 3pm the business day before your surgery to confirm your arrival time and details.        Please don't hesitate to reach out with additional questions or concerns.    Hina Granados MBA, BSN, RN-BC  DELFINO FoleyN, RN  Orthopedic Program Navigators  [facility]  [phone]

## 2024-08-27 NOTE — DISCHARGE INSTRUCTIONS
"[facility]                          Hina Granados MBA, BSN, RN-BC Orthopedic  Phone: [phone]    Sherwin Abernathy D.O.  Phone: [phone]    POSTOPERATIVE INSTRUCTIONS: TOTAL HIP & TOTAL KNEE ARTHROPLASTY  General/highlights: Ice and elevate as often as possible.  When elevating keep the foot higher than the hip with the knee straight.  You can use the back of a couch and pillows as an option.  Wear the ANISH hose during the daytime, remove at night for at least the next 14 days, this is more important on the surgical leg.  Flex/tighten the muscles in the buttocks, thighs and calves often when in chair or bed.  Utilize the incentive spirometer often especially the next 3 days.  Walk at least 10 steps every hour that you are awake.  Take stairs 1 at a time, good leg leads up, bed leg legs down, use the handrails.  You may be weightbearing as tolerated, in general the walker is used for 2 weeks.  New discharge medications: Percocet to be taken as needed for pain, baclofen to be taken as needed for muscle spasm.  The Percocet and baclofen can be taken together but sometimes can cause you to feel \"loopy\".  These medications can also help with sleep.  Aspirin 81 mg by mouth twice a day for the next 2 weeks, this is your blood thinner and is very important to take.  MiraLAX powder once or twice a day when taking the Percocet to avoid constipation.  The MiraLAX and aspirin should be purchased over-the-counter  PAIN, SWELLING & BRUISING  Some pain, stiffness and swelling is normal for up to 1 year after surgery.  Pain will start to let up over time depending on your activity level.  It is preferable to rest for 24 hours following your surgery  Pain is often delayed for 24-48 hours after surgery.  Pain may be dull/achy, throbbing, or even sharp/nerve sensations  It is normal for swelling and bruising to worsen before it gets better.  These symptoms usually peak 1 week " after surgery  Swelling and bruising may appear throughout the leg, all the way down to your toes  Wear your compression stockings every day during the day for 14 days and remove them at night.  This will help control swelling    WOUND CARE INSTRUCTIONS  Your surgical bandage will be removed 2 weeks after surgery at your post-operative visit.  If your bandage becomes compromised, begins to come off before then, or soaks through with drainage call the office immediately.  You may have sutures under the skin that dissolve on their own over time.    As the sutures absorb occasionally a small suture abscess can develop, this is not uncommon for up to 6 weeks, if this occurs please notify your surgeon immediately.    HYGIENE  You may shower 24 hours after your surgery, provided the Mepilex silver dressing is in place.  No tub bathing or submerging underwater.  Do not scrub directly over the surgical bandage  Do not use any creams, lotions or ointments on the surgical leg for 4 weeks after surgery, or until you have been cleared to do so by your surgeon    GENERAL INSTRUCTIONS  Do not drink alcoholic beverages (beer and wine included) for 24 hours following your surgery, or while you are taking narcotic pain medications  Delay making important decisions until you are fully recovered  You cannot swim or submerge in water for at least 6 weeks after surgery, or until you are cleared by your surgeon.  You may start kneeling 3 months after knee replacement surgery, once you have been cleared by your surgeon.  This may not ever feel “normal” or comfortable    HOME DIET  Resume your normal diet after surgery. If you are on a specific type of diet for your condition, resume that instead.    Choose foods that help promote good bowel habits and prevent constipation, such as foods high in fiber.    POSTOPERATIVE MEDICATIONS  Pain medications have been ordered to help manage pain throughout recovery.  While you are using narcotic  "pain medication, you should be using a stool softener or laxative to prevent constipation.  My preference is parallax powder once or twice a day when taking the narcotic pain medicine  It is important to eat a small meal or snack before taking pain medications to avoid nausea or stomach upset.    MEDICATION REFILLS - [phone]  If you need to request a medication refill, please call the office between 8:30am-4:30pm, Monday through Friday.    Any calls received outside of this timeframe will be handled on the next business day.    Medication requests received on Saturday or Sunday will be handled on Monday.    Please allow 3-5 business days for all medication requests to be processed.    RESTARTING HOME MEDICATIONS  You may restart your home medications the following day after your surgery UNLESS you have been given alternate instructions.    Follow the instructions given to you on your hospital discharge instructions for more information regarding your home medications.    ASSISTIVE DEVICE & MOVEMENT  Initially, you will use a walker or crutches to walk for the first 2 weeks.  Once your therapist feels you are ready, you will wean to one crutch or cane followed by no assistive device.  It is important to keep moving throughout recovery.  Walk at least 10 steps every hour that you are awake.  Stairs are part of your recovery, the \"good \"leg leads on the way up, the \"bad \"leg leads on the way down to, use the handrails and not the walker or crutches.  You should be up and walking around several times per day as well as bending your knee and making sure your knee is going completely straight (for knee replacements).  For anterior hip replacements avoid straight leg raise.    NUMBNESS/CLICKING  Decreased sensation or numbness is common on or around the area of the incision due to sensory nerves that are affected at the time of surgery.  The area of numbness will be lateral to the incision  You might always have " numbness but the size of the area of numbness should decrease with time.  It is common for patients to have a “click” in the knee with movement.  This is usually nothing to be concerned about.  There are several reasons why your knee can be making these noises, including the implant components rubbing against each other, or a tendons going over a bony prominence.  Grinding can also occur and is not out of the ordinary.  Grinding can be caused by scar tissue formation  Noises will often settle over time once muscle strength improves.    DIFFICULTY SLEEPING  It is very common for patients to have difficulty sleeping at night which can be caused pain, medication, or feeling of anxiety.  Sleep disturbance typically worsens 4-6 weeks after surgery.  You are permitted to sleep on your back or side with a  pillow between your legs for comfort    DRIVING & TRAVEL  Your surgeon will address this at your post-op appointment.  You must not be taking narcotic pain medication to be cleared to drive.  LEFT LEG JOINT REPLACMENT:  You may drive once you have regained full control of your leg, typically around 2 week after surgery  RIGHT LEG JOINT REPLACEMENT:  You must speak with your surgeon before you resume driving.  Driving can typically be resumed by 4-6 weeks after surgery.  During long distance travel, you should attempt to change position or stand every hour.  You should complete ankle pumps throughout your travel if you are sitting for long periods of time.  If traveling within the first 2 weeks after surgery, you should wear your compression stockings.    DENTAL & OTHER PROCEDURES  All patients must wait a minimum of 3 months for elective procedures, including routine dental cleanings.  For any dental appointment - cleaning or dental procedures - patients must take a prophylactic antibiotic 1 hour before the appointment.  You will also need to call for an antibiotic prior to any other invasive test, procedure, or surgery.   These prophylactic antibiotics will be needed for the rest of your life, in order to prevent infections.  Please call your surgeons office at [phone] to request the antibiotic.      PHYSICAL THERAPY  Following surgery it is important to progress through recovery with in-home or outpatient physical therapy.  You should continue to complete home exercises provided from the hospital on days that you are not working with a physical therapist.  It is common to have a temporary increase in pain and swelling upon starting outpatient physical therapy and/or changing your exercise routine.  Continue to use ice to help with symptoms.     FOLLOW-UP APPOINTMENT - [phone]  Your post-surgical appointments will take place approximately 2 weeks, 6 weeks, 3 months and 1 year following surgery.  Joint replacements are monitored thereafter every 2-5 years for life.  If you have any questions or need to make changes to this appointment, please contact the office:    EMERGENCIES & WHEN TO CALL YOUR SURGEON  When to contact our office immediately:  Any falls or injury to the joint replacement  Fever >101.5 for at least 48 hours after surgery or chills.  Excessive bleeding from incision(s). A small amount of drainage is normal and expected.  Signs of infection of incision(s)-excessive drainage that is soaking through your dressing (especially if it is pus-like), redness that is spreading out from the edges of your incision, or increased warmth around the area.  Excruciating pain for which the pain medication, taken as instructed, is not helping.  Severe calf pain.  Go directly to the emergency room or call 911, if you are experiencing chest pain or difficulty breathing.    ICE/COLD THERAPY  Ice is most important during the first 2 weeks after surgery, but should be used for several weeks as needed.  Never place ice, or cold therapy devices directly on the skin.  You should always have a protective layer between your skin and  the cold.  You have been prescribed to ice your total joint at a minimum of twice per hour for 20 minutes while awake during the first 6 weeks after surgery if you are using ice packs. This will help with pain control.  If you are using an ice machine, please follow ice machine instructions.  After knee replacement is extremely important to elevate the leg straight on an incline, not flat.    COLD THERAPY MACHINE RECOMMENDATIONS  Cold therapy devices can be used before and after surgery to assist in comfort and help to reduce pain and swelling.  These devices differ from ice or ice packs whereas the mechanism circulates water through tubing and a pad to provide longer periods of cold therapy to the desired site.  While in the hospital, you can use your cold devices around the clock for optimal comfort.  We recommend using cold therapy after working with therapy or completing exercises on your own.  Once you are discharged home, there is no set schedule in which you must follow while using cold therapy.  Below are a few points to remember when using a cold therapy device:    Read the 's instructions prior to first the use.  Follow instructions for filling the cooler (water first, then ice).  Always make sure there is a layer of protection between the cold pad and your skin (Clothing, Towel, Ace Bandage, etc.)  Allow the device to circulate cold water throughout the pad prior wrapping the pad around your leg (approximately 10 minutes).  Place the pad on your leg in the desired position to meet your pain management needs and use the wraps provided to secure the pad to your body.  The purpose of this device is to use consistently throughout the day.  You do not need to need to use the 20 on, 20 off method when using an ice machine.  During waking hours, remove the cold pad every 1-2 hours to perform a skin check  Detach the pad from the cooler and ambulate at least once every hour  After removing the pad,  allow at least 30 minutes before resuming cold therapy  You may wear the cold therapy device during periods of sleep including overnight    If you wake up during the night, you can check the skin at this time.  You do not need to wake up specifically to perform skin checks.  Empty the cooler and pad when device is not in use.  Follow 's instructions for cleaning your cold therapy device.    [facility] can assist with problems related to products purchased through the [facility]  [phone] - [place]

## 2024-08-28 ENCOUNTER — ANESTHESIA EVENT (OUTPATIENT)
Dept: OPERATING ROOM | Facility: HOSPITAL | Age: 74
End: 2024-08-28
Payer: MEDICARE

## 2024-08-28 ENCOUNTER — PHARMACY VISIT (OUTPATIENT)
Dept: PHARMACY | Facility: CLINIC | Age: 74
End: 2024-08-28
Payer: COMMERCIAL

## 2024-08-28 ENCOUNTER — ANESTHESIA (OUTPATIENT)
Dept: OPERATING ROOM | Facility: HOSPITAL | Age: 74
End: 2024-08-28
Payer: MEDICARE

## 2024-08-28 ENCOUNTER — HOSPITAL ENCOUNTER (OUTPATIENT)
Facility: HOSPITAL | Age: 74
Setting detail: OUTPATIENT SURGERY
Discharge: HOME | End: 2024-08-28
Attending: ORTHOPAEDIC SURGERY | Admitting: ORTHOPAEDIC SURGERY
Payer: MEDICARE

## 2024-08-28 ENCOUNTER — APPOINTMENT (OUTPATIENT)
Dept: RADIOLOGY | Facility: HOSPITAL | Age: 74
End: 2024-08-28
Payer: MEDICARE

## 2024-08-28 VITALS
BODY MASS INDEX: 24.34 KG/M2 | SYSTOLIC BLOOD PRESSURE: 154 MMHG | WEIGHT: 151.46 LBS | TEMPERATURE: 97.5 F | OXYGEN SATURATION: 98 % | DIASTOLIC BLOOD PRESSURE: 73 MMHG | HEART RATE: 62 BPM | RESPIRATION RATE: 16 BRPM | HEIGHT: 66 IN

## 2024-08-28 DIAGNOSIS — M17.11 PRIMARY OSTEOARTHRITIS OF RIGHT KNEE: Primary | ICD-10-CM

## 2024-08-28 PROBLEM — J45.909 ASTHMA (HHS-HCC): Status: ACTIVE | Noted: 2024-08-28

## 2024-08-28 PROBLEM — N18.9 CHRONIC RENAL INSUFFICIENCY: Status: ACTIVE | Noted: 2024-08-28

## 2024-08-28 PROBLEM — E11.9 DIABETES MELLITUS, TYPE 2 (MULTI): Status: ACTIVE | Noted: 2024-08-28

## 2024-08-28 PROBLEM — G47.33 OSA (OBSTRUCTIVE SLEEP APNEA): Status: ACTIVE | Noted: 2024-08-28

## 2024-08-28 LAB
GLUCOSE BLD MANUAL STRIP-MCNC: 118 MG/DL (ref 74–99)
GLUCOSE BLD MANUAL STRIP-MCNC: 97 MG/DL (ref 74–99)

## 2024-08-28 PROCEDURE — 94760 N-INVAS EAR/PLS OXIMETRY 1: CPT | Mod: 59

## 2024-08-28 PROCEDURE — 2500000004 HC RX 250 GENERAL PHARMACY W/ HCPCS (ALT 636 FOR OP/ED): Performed by: ANESTHESIOLOGIST ASSISTANT

## 2024-08-28 PROCEDURE — 97161 PT EVAL LOW COMPLEX 20 MIN: CPT | Mod: GP

## 2024-08-28 PROCEDURE — 3600000018 HC OR TIME - INITIAL BASE CHARGE - PROCEDURE LEVEL SIX: Performed by: ORTHOPAEDIC SURGERY

## 2024-08-28 PROCEDURE — 64447 NJX AA&/STRD FEMORAL NRV IMG: CPT | Performed by: STUDENT IN AN ORGANIZED HEALTH CARE EDUCATION/TRAINING PROGRAM

## 2024-08-28 PROCEDURE — 73560 X-RAY EXAM OF KNEE 1 OR 2: CPT | Mod: RIGHT SIDE | Performed by: RADIOLOGY

## 2024-08-28 PROCEDURE — 97116 GAIT TRAINING THERAPY: CPT | Mod: GP

## 2024-08-28 PROCEDURE — 7100000002 HC RECOVERY ROOM TIME - EACH INCREMENTAL 1 MINUTE: Performed by: ORTHOPAEDIC SURGERY

## 2024-08-28 PROCEDURE — 3700000001 HC GENERAL ANESTHESIA TIME - INITIAL BASE CHARGE: Performed by: ORTHOPAEDIC SURGERY

## 2024-08-28 PROCEDURE — 2500000004 HC RX 250 GENERAL PHARMACY W/ HCPCS (ALT 636 FOR OP/ED): Performed by: STUDENT IN AN ORGANIZED HEALTH CARE EDUCATION/TRAINING PROGRAM

## 2024-08-28 PROCEDURE — A27447 PR TOTAL KNEE ARTHROPLASTY: Performed by: ANESTHESIOLOGIST ASSISTANT

## 2024-08-28 PROCEDURE — A27447 PR TOTAL KNEE ARTHROPLASTY: Performed by: STUDENT IN AN ORGANIZED HEALTH CARE EDUCATION/TRAINING PROGRAM

## 2024-08-28 PROCEDURE — RXMED WILLOW AMBULATORY MEDICATION CHARGE

## 2024-08-28 PROCEDURE — 3700000002 HC GENERAL ANESTHESIA TIME - EACH INCREMENTAL 1 MINUTE: Performed by: ORTHOPAEDIC SURGERY

## 2024-08-28 PROCEDURE — 2780000003 HC OR 278 NO HCPCS: Performed by: ORTHOPAEDIC SURGERY

## 2024-08-28 PROCEDURE — 7100000001 HC RECOVERY ROOM TIME - INITIAL BASE CHARGE: Performed by: ORTHOPAEDIC SURGERY

## 2024-08-28 PROCEDURE — 2500000004 HC RX 250 GENERAL PHARMACY W/ HCPCS (ALT 636 FOR OP/ED): Mod: JZ | Performed by: ORTHOPAEDIC SURGERY

## 2024-08-28 PROCEDURE — 99100 ANES PT EXTEME AGE<1 YR&>70: CPT | Performed by: STUDENT IN AN ORGANIZED HEALTH CARE EDUCATION/TRAINING PROGRAM

## 2024-08-28 PROCEDURE — C1776 JOINT DEVICE (IMPLANTABLE): HCPCS | Performed by: ORTHOPAEDIC SURGERY

## 2024-08-28 PROCEDURE — 3600000017 HC OR TIME - EACH INCREMENTAL 1 MINUTE - PROCEDURE LEVEL SIX: Performed by: ORTHOPAEDIC SURGERY

## 2024-08-28 PROCEDURE — 2500000005 HC RX 250 GENERAL PHARMACY W/O HCPCS: Performed by: ANESTHESIOLOGIST ASSISTANT

## 2024-08-28 PROCEDURE — A4649 SURGICAL SUPPLIES: HCPCS | Performed by: ORTHOPAEDIC SURGERY

## 2024-08-28 PROCEDURE — 7100000010 HC PHASE TWO TIME - EACH INCREMENTAL 1 MINUTE: Performed by: ORTHOPAEDIC SURGERY

## 2024-08-28 PROCEDURE — 7100000009 HC PHASE TWO TIME - INITIAL BASE CHARGE: Performed by: ORTHOPAEDIC SURGERY

## 2024-08-28 PROCEDURE — 73560 X-RAY EXAM OF KNEE 1 OR 2: CPT | Mod: RT

## 2024-08-28 PROCEDURE — 2500000004 HC RX 250 GENERAL PHARMACY W/ HCPCS (ALT 636 FOR OP/ED): Performed by: ORTHOPAEDIC SURGERY

## 2024-08-28 PROCEDURE — 97110 THERAPEUTIC EXERCISES: CPT | Mod: GP

## 2024-08-28 PROCEDURE — 82947 ASSAY GLUCOSE BLOOD QUANT: CPT

## 2024-08-28 PROCEDURE — 2720000007 HC OR 272 NO HCPCS: Performed by: ORTHOPAEDIC SURGERY

## 2024-08-28 DEVICE — PATELLA
Type: IMPLANTABLE DEVICE | Site: KNEE | Status: FUNCTIONAL
Brand: TRIATHLON

## 2024-08-28 DEVICE — CRUCIATE RETAINING FEMORAL
Type: IMPLANTABLE DEVICE | Site: KNEE | Status: FUNCTIONAL
Brand: TRIATHLON

## 2024-08-28 DEVICE — TIBIAL BEARING INSERT - CS
Type: IMPLANTABLE DEVICE | Site: KNEE | Status: FUNCTIONAL
Brand: TRIATHLON

## 2024-08-28 DEVICE — BONE PINS (3.2MM X 110MM): Type: IMPLANTABLE DEVICE | Site: KNEE | Status: FUNCTIONAL

## 2024-08-28 DEVICE — TIBIAL COMPONENT
Type: IMPLANTABLE DEVICE | Site: KNEE | Status: FUNCTIONAL
Brand: TRIATHLON

## 2024-08-28 RX ORDER — FENTANYL CITRATE 50 UG/ML
50 INJECTION, SOLUTION INTRAMUSCULAR; INTRAVENOUS EVERY 5 MIN PRN
Status: DISCONTINUED | OUTPATIENT
Start: 2024-08-28 | End: 2024-08-28 | Stop reason: HOSPADM

## 2024-08-28 RX ORDER — DIPHENHYDRAMINE HYDROCHLORIDE 50 MG/ML
12.5 INJECTION INTRAMUSCULAR; INTRAVENOUS EVERY 6 HOURS PRN
Status: DISCONTINUED | OUTPATIENT
Start: 2024-08-28 | End: 2024-08-28 | Stop reason: HOSPADM

## 2024-08-28 RX ORDER — ROPIVACAINE/EPI/CLONIDINE/KET 2.46-0.005
SYRINGE (ML) INJECTION AS NEEDED
Status: DISCONTINUED | OUTPATIENT
Start: 2024-08-28 | End: 2024-08-28 | Stop reason: HOSPADM

## 2024-08-28 RX ORDER — ONDANSETRON HYDROCHLORIDE 2 MG/ML
4 INJECTION, SOLUTION INTRAVENOUS ONCE AS NEEDED
Status: DISCONTINUED | OUTPATIENT
Start: 2024-08-28 | End: 2024-08-28 | Stop reason: HOSPADM

## 2024-08-28 RX ORDER — OXYCODONE AND ACETAMINOPHEN 5; 325 MG/1; MG/1
0.5 TABLET ORAL EVERY 4 HOURS PRN
Status: DISCONTINUED | OUTPATIENT
Start: 2024-08-28 | End: 2024-08-28 | Stop reason: HOSPADM

## 2024-08-28 RX ORDER — IPRATROPIUM BROMIDE 0.5 MG/2.5ML
500 SOLUTION RESPIRATORY (INHALATION) EVERY 30 MIN PRN
Status: DISCONTINUED | OUTPATIENT
Start: 2024-08-28 | End: 2024-08-28 | Stop reason: HOSPADM

## 2024-08-28 RX ORDER — SODIUM CHLORIDE, SODIUM LACTATE, POTASSIUM CHLORIDE, CALCIUM CHLORIDE 600; 310; 30; 20 MG/100ML; MG/100ML; MG/100ML; MG/100ML
20 INJECTION, SOLUTION INTRAVENOUS CONTINUOUS
Status: DISCONTINUED | OUTPATIENT
Start: 2024-08-28 | End: 2024-08-28 | Stop reason: HOSPADM

## 2024-08-28 RX ORDER — ONDANSETRON 4 MG/1
4 TABLET, ORALLY DISINTEGRATING ORAL EVERY 8 HOURS PRN
Status: DISCONTINUED | OUTPATIENT
Start: 2024-08-28 | End: 2024-08-28 | Stop reason: HOSPADM

## 2024-08-28 RX ORDER — ALBUTEROL SULFATE 0.83 MG/ML
2.5 SOLUTION RESPIRATORY (INHALATION) EVERY 30 MIN PRN
Status: DISCONTINUED | OUTPATIENT
Start: 2024-08-28 | End: 2024-08-28 | Stop reason: HOSPADM

## 2024-08-28 RX ORDER — SODIUM CHLORIDE, SODIUM LACTATE, POTASSIUM CHLORIDE, CALCIUM CHLORIDE 600; 310; 30; 20 MG/100ML; MG/100ML; MG/100ML; MG/100ML
100 INJECTION, SOLUTION INTRAVENOUS CONTINUOUS
Status: DISCONTINUED | OUTPATIENT
Start: 2024-08-28 | End: 2024-08-28 | Stop reason: HOSPADM

## 2024-08-28 RX ORDER — ONDANSETRON HYDROCHLORIDE 2 MG/ML
INJECTION, SOLUTION INTRAVENOUS AS NEEDED
Status: DISCONTINUED | OUTPATIENT
Start: 2024-08-28 | End: 2024-08-28

## 2024-08-28 RX ORDER — KETOROLAC TROMETHAMINE 15 MG/ML
15 INJECTION, SOLUTION INTRAMUSCULAR; INTRAVENOUS EVERY 6 HOURS
Status: DISCONTINUED | OUTPATIENT
Start: 2024-08-28 | End: 2024-08-28 | Stop reason: HOSPADM

## 2024-08-28 RX ORDER — GLYCOPYRROLATE 0.2 MG/ML
INJECTION INTRAMUSCULAR; INTRAVENOUS AS NEEDED
Status: DISCONTINUED | OUTPATIENT
Start: 2024-08-28 | End: 2024-08-28

## 2024-08-28 RX ORDER — CEFAZOLIN SODIUM 2 G/100ML
2 INJECTION, SOLUTION INTRAVENOUS ONCE
Status: COMPLETED | OUTPATIENT
Start: 2024-08-28 | End: 2024-08-28

## 2024-08-28 RX ORDER — LABETALOL HYDROCHLORIDE 5 MG/ML
5 INJECTION, SOLUTION INTRAVENOUS EVERY 5 MIN PRN
Status: DISCONTINUED | OUTPATIENT
Start: 2024-08-28 | End: 2024-08-28 | Stop reason: HOSPADM

## 2024-08-28 RX ORDER — PROPOFOL 10 MG/ML
INJECTION, EMULSION INTRAVENOUS AS NEEDED
Status: DISCONTINUED | OUTPATIENT
Start: 2024-08-28 | End: 2024-08-28

## 2024-08-28 RX ORDER — OXYCODONE AND ACETAMINOPHEN 5; 325 MG/1; MG/1
1 TABLET ORAL EVERY 4 HOURS PRN
Status: DISCONTINUED | OUTPATIENT
Start: 2024-08-28 | End: 2024-08-28 | Stop reason: HOSPADM

## 2024-08-28 RX ORDER — ASPIRIN 81 MG/1
81 TABLET ORAL 2 TIMES DAILY
COMMUNITY
Start: 2024-08-28 | End: 2024-09-11

## 2024-08-28 RX ORDER — TRANEXAMIC ACID 100 MG/ML
INJECTION, SOLUTION INTRAVENOUS AS NEEDED
Status: DISCONTINUED | OUTPATIENT
Start: 2024-08-28 | End: 2024-08-28

## 2024-08-28 RX ORDER — FUROSEMIDE 20 MG/1
20 TABLET ORAL 3 TIMES WEEKLY
COMMUNITY

## 2024-08-28 RX ORDER — LIDOCAINE HYDROCHLORIDE 10 MG/ML
INJECTION, SOLUTION INTRAVENOUS AS NEEDED
Status: DISCONTINUED | OUTPATIENT
Start: 2024-08-28 | End: 2024-08-28

## 2024-08-28 RX ORDER — ONDANSETRON HYDROCHLORIDE 2 MG/ML
4 INJECTION, SOLUTION INTRAVENOUS EVERY 8 HOURS PRN
Status: DISCONTINUED | OUTPATIENT
Start: 2024-08-28 | End: 2024-08-28 | Stop reason: HOSPADM

## 2024-08-28 RX ORDER — ACETAMINOPHEN 325 MG/1
650 TABLET ORAL EVERY 6 HOURS PRN
Status: DISCONTINUED | OUTPATIENT
Start: 2024-08-28 | End: 2024-08-28 | Stop reason: HOSPADM

## 2024-08-28 RX ORDER — FENTANYL CITRATE 50 UG/ML
50 INJECTION, SOLUTION INTRAMUSCULAR; INTRAVENOUS ONCE
Status: COMPLETED | OUTPATIENT
Start: 2024-08-28 | End: 2024-08-28

## 2024-08-28 RX ORDER — BUPIVACAINE HYDROCHLORIDE 7.5 MG/ML
INJECTION, SOLUTION INTRASPINAL AS NEEDED
Status: DISCONTINUED | OUTPATIENT
Start: 2024-08-28 | End: 2024-08-28

## 2024-08-28 RX ORDER — MEPERIDINE HYDROCHLORIDE 25 MG/ML
12.5 INJECTION INTRAMUSCULAR; INTRAVENOUS; SUBCUTANEOUS EVERY 10 MIN PRN
Status: DISCONTINUED | OUTPATIENT
Start: 2024-08-28 | End: 2024-08-28 | Stop reason: HOSPADM

## 2024-08-28 RX ORDER — POLYETHYLENE GLYCOL 3350 17 G/17G
17 POWDER, FOR SOLUTION ORAL DAILY
Status: DISCONTINUED | OUTPATIENT
Start: 2024-08-29 | End: 2024-08-28 | Stop reason: HOSPADM

## 2024-08-28 RX ORDER — ASPIRIN 81 MG/1
81 TABLET ORAL 2 TIMES DAILY
Status: DISCONTINUED | OUTPATIENT
Start: 2024-08-28 | End: 2024-08-28 | Stop reason: HOSPADM

## 2024-08-28 RX ORDER — HYDROMORPHONE HYDROCHLORIDE 0.2 MG/ML
0.2 INJECTION INTRAMUSCULAR; INTRAVENOUS; SUBCUTANEOUS EVERY 5 MIN PRN
Status: DISCONTINUED | OUTPATIENT
Start: 2024-08-28 | End: 2024-08-28 | Stop reason: HOSPADM

## 2024-08-28 RX ORDER — POLYETHYLENE GLYCOL 3350 17 G/17G
17 POWDER, FOR SOLUTION ORAL DAILY
COMMUNITY
Start: 2024-08-28

## 2024-08-28 RX ORDER — CEFAZOLIN SODIUM 2 G/100ML
2 INJECTION, SOLUTION INTRAVENOUS EVERY 8 HOURS
Status: DISCONTINUED | OUTPATIENT
Start: 2024-08-28 | End: 2024-08-28 | Stop reason: HOSPADM

## 2024-08-28 RX ORDER — MIDAZOLAM HYDROCHLORIDE 1 MG/ML
2 INJECTION, SOLUTION INTRAMUSCULAR; INTRAVENOUS ONCE
Status: COMPLETED | OUTPATIENT
Start: 2024-08-28 | End: 2024-08-28

## 2024-08-28 RX ORDER — OXYCODONE AND ACETAMINOPHEN 10; 325 MG/1; MG/1
1 TABLET ORAL EVERY 4 HOURS PRN
Status: DISCONTINUED | OUTPATIENT
Start: 2024-08-28 | End: 2024-08-28 | Stop reason: HOSPADM

## 2024-08-28 RX ORDER — NALOXONE HYDROCHLORIDE 0.4 MG/ML
0.2 INJECTION, SOLUTION INTRAMUSCULAR; INTRAVENOUS; SUBCUTANEOUS EVERY 5 MIN PRN
Status: DISCONTINUED | OUTPATIENT
Start: 2024-08-28 | End: 2024-08-28 | Stop reason: HOSPADM

## 2024-08-28 RX ORDER — SODIUM CHLORIDE, SODIUM LACTATE, POTASSIUM CHLORIDE, CALCIUM CHLORIDE 600; 310; 30; 20 MG/100ML; MG/100ML; MG/100ML; MG/100ML
40 INJECTION, SOLUTION INTRAVENOUS CONTINUOUS
Status: DISCONTINUED | OUTPATIENT
Start: 2024-08-28 | End: 2024-08-28 | Stop reason: HOSPADM

## 2024-08-28 SDOH — HEALTH STABILITY: MENTAL HEALTH: CURRENT SMOKER: 0

## 2024-08-28 ASSESSMENT — ACTIVITIES OF DAILY LIVING (ADL)
ADLS_ADDRESSED: YES
ADL_ASSISTANCE: INDEPENDENT

## 2024-08-28 ASSESSMENT — PAIN SCALES - GENERAL
PAINLEVEL_OUTOF10: 4
PAINLEVEL_OUTOF10: 0 - NO PAIN
PAINLEVEL_OUTOF10: 5 - MODERATE PAIN
PAINLEVEL_OUTOF10: 0 - NO PAIN
PAINLEVEL_OUTOF10: 4
PAINLEVEL_OUTOF10: 0 - NO PAIN
PAINLEVEL_OUTOF10: 2
PAINLEVEL_OUTOF10: 2
PAINLEVEL_OUTOF10: 0 - NO PAIN

## 2024-08-28 ASSESSMENT — COGNITIVE AND FUNCTIONAL STATUS - GENERAL: MOBILITY SCORE: 24

## 2024-08-28 ASSESSMENT — COLUMBIA-SUICIDE SEVERITY RATING SCALE - C-SSRS
6. HAVE YOU EVER DONE ANYTHING, STARTED TO DO ANYTHING, OR PREPARED TO DO ANYTHING TO END YOUR LIFE?: NO
2. HAVE YOU ACTUALLY HAD ANY THOUGHTS OF KILLING YOURSELF?: NO
1. IN THE PAST MONTH, HAVE YOU WISHED YOU WERE DEAD OR WISHED YOU COULD GO TO SLEEP AND NOT WAKE UP?: NO

## 2024-08-28 ASSESSMENT — PAIN DESCRIPTION - ORIENTATION: ORIENTATION: RIGHT

## 2024-08-28 ASSESSMENT — PAIN DESCRIPTION - LOCATION: LOCATION: KNEE

## 2024-08-28 ASSESSMENT — PAIN DESCRIPTION - DESCRIPTORS: DESCRIPTORS: ACHING

## 2024-08-28 NOTE — PERIOPERATIVE NURSING NOTE
Patient was able to void.  He has met all requirements for discharge.  Reviewed discharge instructions with patient and family and all questions were answered.  IV discontinued by RN and patient taken to car via wheelchair. No  here today.  Confirmed with Angelica Granados that patient is all set up for home and she stated he was good to go.

## 2024-08-28 NOTE — OP NOTE
RIGHT OPEN TOTAL KNEE ARTHROPLASTY (R) Operative Note     Date: 2024  OR Location: [facility]    Name: Jose Garrett, : 1950, Age: 73 y.o., MRN: [id number], Sex: male    Diagnosis  Pre-op Diagnosis      * Primary osteoarthritis of right knee [M17.11] Post-op Diagnosis     * Primary osteoarthritis of right knee [M17.11]     Procedures  RIGHT OPEN TOTAL KNEE ARTHROPLASTY  09020 - IA ARTHRP KNE CONDYLE&PLATU MEDIAL&LAT COMPARTMENTS      Surgeons      * Sherwin Abernathy - Primary    Resident/Fellow/Other Assistant:  Surgeons and Role:  * No surgeons found with a matching role *    Procedure Summary  Anesthesia: Regional, Spinal  ASA: III  Anesthesia Staff: Anesthesiologist: Grant Baum DO  C-AA: RO Swain  Estimated Blood Loss: 5 mL  Intra-op Medications:   Administrations occurring from 0700 to 0915 on 24:   Medication Name Total Dose   ropivacaine-epinephrine-clonidine-ketorolac 2.46-0.005- 0.0008-0.3mg/mL periarticular syringe 50 mL   ceFAZolin (Ancef) 2 g in dextrose (iso)  mL 2 g              Anesthesia Record               Intraprocedure I/O Totals          Intake    Tranexamic Acid 0.00 mL    The total shown is the total volume documented since Anesthesia Start was filed.    Propofol Drip 0.00 mL    The total shown is the total volume documented since Anesthesia Start was filed.    ceFAZolin (Ancef) 2 g in dextrose (iso)  mL 100.00 mL    Total Intake 100 mL          Specimen: No specimens collected     Staff:   Circulator: Lili  Scrub Person: Mayur  Scrub Person: Taylor         Drains and/or Catheters: * None in log *    Tourniquet Times:   * Missing tourniquet times found for documented tourniquets in lo *     Implants:  Implants       Type Name Action Serial No.       3.2MM X 110MM RADHA BONE PIN  Implanted       3.2MM X 140MM RADHA BONE PIN Implanted      Joint COMPONENT, CR FEMORAL, P4, BEADED W/PA, RIGHT - [id number] Implanted      Joint BASEPLATE,  TRIATHLON TRITANIUM, SIZE 5, 49MM - [id number] Implanted      Joint INSERT, TIBIAL, X3 TRIATHLON CS, SZ-5 9MM - [id number] Implanted      Joint PATELLA, TRIATHLON, ASYMMETRIC,  SIZE A35 - [id number] Implanted               Findings: Preoperative examination under anesthesia revealed mild flexion contracture, significant varus deformity that was correctable, positive thumbs test and posterior tibial sag sign consistent with chronic PCL deficiency.  After dissection down to the knee severe arthrosis was encountered greatest in the medial and patellofemoral compartment also consistent with chronic PCL deficiency.  After placement of a well-fixed total knee arthroplasty excellent range of motion, excellent stability at the arc of motion and midline patellar tracking was achieved    Indications: Jose Garrett is an 73 y.o. male who is having surgery for Primary osteoarthritis of right knee [M17.11].  Today for robotic assisted right total knee arthroplasty    The patient was seen in the preoperative area. The risks, benefits, complications, treatment options, non-operative alternatives, expected recovery and outcomes were discussed with the patient. The possibilities of reaction to medication, pulmonary aspiration, injury to surrounding structures, bleeding, recurrent infection, the need for additional procedures, failure to diagnose a condition, and creating a complication requiring transfusion or operation were discussed with the patient. The patient concurred with the proposed plan, giving informed consent.  The site of surgery was properly noted/marked if necessary per policy. The patient has been actively warmed in preoperative area. Preoperative antibiotics have been ordered and given within 1 hours of incision. Venous thrombosis prophylaxis have been ordered including unilateral sequential compression device    Procedure Details: The patient was correctly identified in preoperative holding, appropriate regional  blocks were provided by the department of anesthesia.  Patient was taken to the operative suite and placed in the seated position, a spinal anesthetic was administered.  The patient was then placed in the supine position.  Examination under anesthesia was carried forth.  A well-padded tourniquet was placed on the right upper thigh.  The right lower extremity was sterilely prepped and draped in the usual surgical fashion, Ioban drape and leg assistant employed.  A sterile Esmarch was used to exsanguinate the limb and the tourniquet was elevated to 250 mmHg.  Standard midline incision was made followed by a medial parapatellar arthrotomy.  A minimal medial release was performed high on the tibia to allow placement of retractors, fat pad was excised.  Femoral and tibial checkpoints were then placed followed by pelvic and tibial array (placed intra incisional).   Hip center rotation was then registered followed by location of medial and lateral malleoli.  Femoral and tibial registration was then performed with excellent accuracy.  The ACL was released off of the tibia, anterior horn of the lateral meniscus excised.  Osteophytes were then resected.  The digital tensiometer was then utilized to tailor the osseous resections to the soft tissue tensioning.  The goal of creating a medial pivot with the knee symmetric medially and laterally in extension, medially in flexion and extension, slightly loose on the lateral side and flexion.   The femoral and tibial cuts were then made, the cut osseous fragments were removed from the knee.  Meniscal cartilages excised, posterior osteophytes removed.  Trial implants were then placed with a best fit size 5 tibia, size 4 femur and a 9 mm polyethylene.  The digital tensiometer revealed excellent stability both in flexion and extension with approximately 0.5-1 mm of sternal lateral laxity in flexion.  The patella was then cut and sized.  Patellar lug holes were then drilled for  press-fit implant and a trial patella was affixed.  Excellent range of motion, excellent stability throughout the arc of motion.  Tibial rotation was then set to match the femur, the baseplate pinned anteriorly.  The knee was flexed up.  Lug holes drilled in the femur for press-fit plant.  Proximal tibial preparation was then completed in conventional fashion for press-fit technique.  The knee was then soaked with Irrisept and thoroughly irrigated.  The posterior capsule and soft tissue envelope injected with a local anesthetic cocktail.  Any bleeding vessels were coagulated with electrocautery.  The cut osseous surfaces were thoroughly dried and clear of any blood, fat or saline.   The tibial component was impacted onto the cut surface of the tibia.  The polyethylene was impacted onto the baseplate.  The femoral component was then impacted onto the cut surface of the femur.  The knee was then reduced and placed in full extension.  The patellar component was then press-fit with the appropriate clamp.    The knee once again was soaked with Irrisept and thoroughly irrigated.  The arthrotomy was then closed proximally and distally with #1 Vicryl suture, from the proximal pole of the patella to the distal pole of the patella with interrupted figure-of-eight #2 FiberWire suture.  A watertight closure was achieved.  The skin and subcutaneous tissues were closed in typical layered fashion.  Skin edges were reapproximated with skin glue followed by Steri-Strips with adhesive.  A Mepilex Ag dressing was applied followed by a long leg ANISH hose.  The tourniquet was then deflated with excellent return of blood flow to the extremity.  The patient was lightened from anesthesia and taken to recovery room in stable condition without complication  Complications:  None; patient tolerated the procedure well.    Disposition: PACU - hemodynamically stable.  Condition: stable         Additional Details: na    Attending Attestation:      Sherwin Abernathy  Phone Number: [phone]

## 2024-08-28 NOTE — PERIOPERATIVE NURSING NOTE
0905 Arrives to pacu  unresponsive Lungs clear sinus rhythm room air saturation within normal limits. IV patient temperature low placed on Mainor hugger. Dressing dry and intact. Circulation checks within normal limits.    0910 Xray released at bedside.Patient opens eyes during procedure.

## 2024-08-28 NOTE — PERIOPERATIVE NURSING NOTE
0915 Awakens with Xray taken. No pain or nausea. Unable to move feet. T12/ Right L1 Left. Circulation checks within normal limits sips of water taken well. Sequential teds applied per order on. Myles hose on bilaterally    0930 resting no complaints vitals stable. Spinal L2/L2.     0945 Spinal L4  Able to bend left knee. No pain or nausea sipping water well. Accucheck 118.

## 2024-08-28 NOTE — ANESTHESIA PROCEDURE NOTES
Spinal Block    Patient location during procedure: OR  Start time: 8/28/2024 7:07 AM  End time: 8/28/2024 7:08 AM  Reason for block: primary anesthetic  Staffing  Performed: attending   Authorized by: Grant Baum DO    Performed by: Grant Baum DO    Preanesthetic Checklist  Completed: patient identified, IV checked, risks and benefits discussed, surgical consent, monitors and equipment checked, pre-op evaluation, timeout performed and sterile techniques followed  Block Timeout  RN/Licensed healthcare professional reads aloud to the Anesthesia provider and entire team: Patient identity, procedure with side and site, patient position, and as applicable the availability of implants/special equipment/special requirements.  Patient on coagulant treatment: no  Timeout performed at: 8/28/2024 7:05 AM  Spinal Block  Patient position: sitting  Prep: Betadine  Sterility prep: mask, hand hygiene, gloves and drape  Sedation level: light sedation  Patient monitoring: blood pressure, continuous pulse oximetry and heart rate  Approach: midline  Vertebral space: L3-4  Injection technique: single-shot  Needle  Needle gauge: 25 G  Needle length: 5 in  Free flowing CSF: yes    Assessment  Sensory level: T10  Block outcome: block to be assessed in the OR  Procedure assessment: patient tolerated procedure well with no immediate complications  Additional Notes  Easy placement, first attempt, no complications.  1.2 mL .75% bupivacaine injected into intrathecal space

## 2024-08-28 NOTE — PROGRESS NOTES
Medication Education     Medication education for Jose Garrett was provided to the patient and family for the following medication(s):    Baclofen  Percocet      Medication education provided by a Pharmacist:  Dose, frequency, storage How to take and what to do if a dose is missed Proper dose, indication, possible ADRs How the medication works and benefits of taking it    Identified potential barriers to education:  None    Method(s) of Education:  Verbal Written materials provided and reviewed    An opportunity to ask questions and receive answers was provided.     Assessment of understanding the patient and family:  2= meets goals/outcomes    Additional Notes (if applicable): meds 2 beds delivered to patient    Ana Sweeney, DivinaD

## 2024-08-28 NOTE — ANESTHESIA PROCEDURE NOTES
Peripheral Block    Patient location during procedure: pre-op  Start time: 8/28/2024 6:54 AM  End time: 8/28/2024 6:55 AM  Reason for block: at surgeon's request and post-op pain management  Staffing  Performed: attending   Authorized by: Grant Baum DO    Performed by: Grant Baum DO  Preanesthetic Checklist  Completed: patient identified, IV checked, site marked, risks and benefits discussed, surgical consent, monitors and equipment checked, pre-op evaluation and timeout performed   Timeout performed at: 8/28/2024 6:50 AM  Peripheral Block  Patient position: laying flat  Prep: ChloraPrep  Patient monitoring: heart rate, cardiac monitor and continuous pulse ox  Block type: adductor canal  Laterality: right  Injection technique: single-shot  Guidance: ultrasound guided  Local infiltration: ropivacaine  Infiltration strength: 0.5 %  Dose: 30 mL  Needle  Needle gauge: 22 G  Needle length: 10 cm  Needle localization: ultrasound guidance     image stored in chart  Assessment  Injection assessment: negative aspiration for heme, no paresthesia on injection and incremental injection  Paresthesia pain: none  Heart rate change: no  Slow fractionated injection: yes  Additional Notes  With 4mg decadron

## 2024-08-28 NOTE — PERIOPERATIVE NURSING NOTE
Received report and patient brought over to phase-2.  Assessment complete.  Care team notified that patient is now in phase-2.

## 2024-08-28 NOTE — CARE PLAN
Problem: Balance  Goal: LTG - Patient will maintain standing and sitting balance to allow for completion of daily activities  Outcome: Met     Problem: Mobility  Goal: LTG - Patient will be able to go up and down a curb/step with rolling walker at close supervision level  Outcome: Met  Goal: LTG - Patient will ambulate household distance with rolling walker at distant supervision   Outcome: Met     Problem: Safety  Goal: LTG - Patient will demonstrate safety requirements appropriate to situation/environment  Outcome: Met     Problem: PT Transfers  Goal: LTG - Patient will demonstrate safe transfer techniques with rolling walker at distant supervision   Outcome: Met     Problem: Pain  Goal: LTG - Patient will manage pain with the appropriate technique/Intervention  Outcome: Met     Problem: Compromised Skin Integrity  Goal: LTG - Patient will be free from infection  Outcome: Met

## 2024-08-28 NOTE — ANESTHESIA PROCEDURE NOTES
Peripheral Block    Patient location during procedure: pre-op  Start time: 8/28/2024 6:55 AM  End time: 8/28/2024 6:56 AM  Reason for block: at surgeon's request and post-op pain management  Staffing  Performed: attending   Authorized by: Grant Baum DO    Performed by: Grant Baum DO  Preanesthetic Checklist  Completed: patient identified, IV checked, site marked, risks and benefits discussed, surgical consent, monitors and equipment checked, pre-op evaluation and timeout performed   Timeout performed at: 8/28/2024 6:50 AM  Peripheral Block  Patient position: laying flat  Prep: ChloraPrep  Patient monitoring: heart rate, cardiac monitor and continuous pulse ox  Block type: other (IPACK)  Laterality: right  Injection technique: single-shot  Guidance: ultrasound guided  Local infiltration: ropivacaine  Infiltration strength: 0.5 %  Dose: 10 mL  Needle  Needle gauge: 22 G  Needle length: 10 cm  Needle localization: ultrasound guidance     image stored in chart  Assessment  Injection assessment: negative aspiration for heme, no paresthesia on injection and incremental injection  Paresthesia pain: none  Heart rate change: no  Slow fractionated injection: yes

## 2024-08-28 NOTE — PROGRESS NOTES
08/28/24 1329   Discharge Planning   Expected Discharge Disposition Home   Does the patient need discharge transport arranged? No     Patient is planned discharge home today with outpatient therapy, per nursing outpatient appt  is set to begin on 9/3

## 2024-08-28 NOTE — PROGRESS NOTES
Physical Therapy Evaluation & Treatment    Patient Name: Jose Garrett  MRN: [id number]  Today's Date: 8/28/2024   Time Calculation  Start Time: 1121  Stop Time: 1203  Time Calculation (min): 42 min    Assessment/Plan   PT Assessment  PT Assessment Results: Decreased strength, Decreased range of motion, Decreased mobility, Orthopedic restrictions, Pain  Rehab Prognosis: Excellent  Evaluation/Treatment Tolerance: Patient tolerated treatment well  Medical Staff Made Aware: Yes  Strengths: Ability to acquire knowledge, Access to adaptive/assistive products, Attitude of self, Capable of completing ADLs semi/independent, Housing layout, Insight into problems, Support of Caregivers, Support and attitude of living partners  End of Session Communication: Bedside nurse, Physician  Assessment Comment: Pt low complexity eval presenting with increased pain and deficits to functional mobility following R TKA performed on 8/28/2024. Education regarding TKA precautions provided with handout issued; pt verbalized understanding. Therapeutic exercises performed; HEP handout provided. Pt able to participate in bed mobility, functional transfers, ambulation with walker, and stair negotiation with walker at high functional level without knee buckle or LOB. PT recommends DC home with assist as needed and follow-up with OP PT.     End of Session Patient Position: Bed, 3 rail up, Alarm off, not on at start of session with RLE elevated + extended and ice to surgical site. B SCD's donned. Call light left in reach; patient instructed not to get up on own and verbalized understanding of this. RN aware. Dr. Abernathy in with pt at time of PT exit.     IP OR SWING BED PT PLAN  Inpatient or Swing Bed: Inpatient  PT Plan  Treatment/Interventions: Bed mobility, Transfer training, Gait training, Stair training, Strengthening, Range of motion, Therapeutic exercise, Therapeutic activity, Home exercise program, Positioning  PT Plan: Ongoing PT  PT  Frequency: BID  PT Discharge Recommendations: Low intensity level of continued care  Equipment Recommended upon Discharge: Wheeled walker  PT Recommended Transfer Status: Independent, Assistive device  PT - OK to Discharge: Yes      Subjective     General Visit Information:  General  Reason for Referral: R TKA (8/28/2024)  Referred By: Dr. Abernathy  Past Medical History Relevant to Rehab: OA, CORTES, HTN, CKD III, DM, asthma, BPH, SIADH, hyponatremia, HTN, hernia repair, L3-L4 laminectomy; ex smoker; hearing aids  Family/Caregiver Present: Yes (wife and son)  Prior to Session Communication: Bedside nurse  Patient Position Received: On cart  General Comment: Session cleared by RN. Pt very pleasant and agreeable to PT evaluation. Dressing to R knee dry & intact.     Home Living:  Home Living  Type of Home: House  Lives With: Spouse  Home Adaptive Equipment: Walker rolling or standard, Cane, Crutches  Home Layout: One level  Home Access: Stairs to enter without rails  Entrance Stairs-Number of Steps: 1 platform OMKAR  Bathroom Shower/Tub: Walk-in shower  Bathroom Equipment: Grab bars in shower, Shower chair with back  Prior Level of Function:  Prior Function Per Pt/Caregiver Report  Level of [place]: Independent with ADLs and functional transfers, Independent with homemaking with ambulation  ADL Assistance: Independent  Homemaking Assistance: Independent  Ambulatory Assistance: Needs assistance  Gait: Assistive device (cane)  Vocational: Retired  Prior Function Comments: Pt denies falls prior to admission  Precautions:  Precautions  LE Weight Bearing Status: Weight Bearing as Tolerated  Medical Precautions: Fall precautions  Post-Surgical Precautions: Right total knee precautions    Vital Signs (Past 2hrs)        Date/Time Vitals Session Patient Position Pulse Resp SpO2 BP MAP (mmHg)    08/28/24 1324 --  --  62  16  --  168/80  --     08/28/24 1338 --  --  --  --  --  154/73  --                   Objective   Pain:  Pain  Assessment  Pain Assessment: 0-10  0-10 (Numeric) Pain Score: 4  Pain Type: Surgical pain  Pain Location: Knee  Pain Orientation: Right  Pain Interventions: Medication (See MAR), Cold applied, Ambulation/increased activity, Elevated (RN admin pain meds at start of PT session)  Cognition:  Cognition  Overall Cognitive Status: Within Functional Limits  Attention: Within Functional Limits  Memory: Within Funtional Limits  Problem Solving: Within Functional Limits  Safety/Judgement: Within Functional Limits  Insight: Within function limits  Impulsive: Within functional limits  Processing Speed: Within funtional limits    General Assessments:  Activity Tolerance  Endurance: Endurance does not limit participation in activity    Sensation  Light Touch: No apparent deficits  Proprioception: No apparent deficits    Coordination  Movements are Fluid and Coordinated: Yes    Postural Control  Postural Control: Within Functional Limits    Static Sitting Balance  Static Sitting-Balance Support: Feet supported, Bilateral upper extremity supported  Static Sitting-Level of Assistance: Independent    Static Standing Balance  Static Standing-Balance Support: Bilateral upper extremity supported  Static Standing-Level of Assistance: Distant supervision  Static Standing-Comment/Number of Minutes: with rolling walker  Dynamic Standing Balance  Dynamic Standing-Balance Support: Bilateral upper extremity supported  Dynamic Standing-Level of Assistance: Distant supervision  Dynamic Standing-Comments: with rolling walker + gait belt  Functional Assessments:  ADL  ADL's Addressed: Yes  Functional Deficit: Other (Comment), Verbal cueing, Supervision/safety (PT instructed pt on standing at walker over toilet to attempt to urinate; pt stood ~5 minutes with intermittent removal of BUE support without LOB or postural sway; unable to void at this time, RN aware.)    Bed Mobility  Bed Mobility: Yes  Bed Mobility 1  Bed Mobility 1: Supine to sitting,  Sitting to supine  Level of Assistance 1: Independent    Transfers  Transfer: Yes  Transfer 1  Transfer From 1: Bed to  Transfer to 1: Stand  Technique 1: Sit to stand, Stand to sit  Transfer Device 1: Walker, Gait belt  Transfer Level of Assistance 1: Distant supervision, Minimal verbal cues (cues for hand placement)  Trials/Comments 1: x2    Ambulation/Gait Training  Ambulation/Gait Training Performed: Yes  Ambulation/Gait Training 1  Surface 1: Level tile  Device 1: Rolling walker  Gait Support Devices: Gait belt  Assistance 1: Distant supervision, Minimal verbal cues (cues for sequencing)  Quality of Gait 1: Decreased step length, Antalgic (no knee buckle or LOB; decreased yusef; reciprocal pattern)  Comments/Distance (ft) 1: 100 feet    Stairs  Stairs: Yes  Stairs  Curb Step 1: Yes  Device 1: Wheeled walker  Support Devices 1: Gait belt  Assistance 1: Close supervision, Minimal verbal cues (cues for sequencing and body mechanics)  Comment/Number of Steps 1: ascending/descending curb steps simulated in pt room x2; pt able to clear B feet with proper sequencing following cues from PT. no knee buckle or LOB  Extremity/Trunk Assessments:  RUE   RUE : Within Functional Limits  LUE   LUE: Within Functional Limits  RLE   RLE : Within Functional Limits  LLE   LLE : Within Functional Limits  Treatments:  Therapeutic Exercise  Therapeutic Exercise Performed: Yes B ankle pumps, R quad sets, R gluteal sets, R heel slides, R SAQ, R hip abduction, and R SLR x 10 reps each.    Outcome Measures:  [facility] Basic Mobility  Turning from your back to your side while in a flat bed without using bedrails: None  Moving from lying on your back to sitting on the side of a flat bed without using bedrails: None  Moving to and from bed to chair (including a wheelchair): None  Standing up from a chair using your arms (e.g. wheelchair or bedside chair): None  To walk in hospital room: None  Climbing 3-5 steps with railing: None  Basic  Mobility - Total Score: 24    Encounter Problems       Encounter Problems (Active)       Pain             Encounter Problems (Resolved)       Balance       LTG - Patient will maintain standing and sitting balance to allow for completion of daily activities (Met)       Start:  08/28/24    Resolved:  08/28/24            Compromised Skin Integrity       LTG - Patient will be free from infection (Met)       Start:  08/28/24    Resolved:  08/28/24            Mobility       LTG - Patient will be able to go up and down a curb/step with rolling walker at close supervision level (Met)       Start:  08/28/24    Resolved:  08/28/24         LTG - Patient will ambulate household distance with rolling walker at distant supervision  (Met)       Start:  08/28/24    Resolved:  08/28/24            PT Transfers       LTG - Patient will demonstrate safe transfer techniques with rolling walker at distant supervision  (Met)       Start:  08/28/24    Resolved:  08/28/24            Safety       LTG - Patient will demonstrate safety requirements appropriate to situation/environment (Met)       Start:  08/28/24    Resolved:  08/28/24                Education Documentation  Handouts, taught by Che Izquierdo PT at 8/28/2024  1:39 PM.  Learner: Family, Significant Other, Patient  Readiness: Eager  Method: Explanation, Demonstration, Handout  Response: Verbalizes Understanding, Demonstrated Understanding    Precautions, taught by Che Izquierdo PT at 8/28/2024  1:39 PM.  Learner: Family, Significant Other, Patient  Readiness: Eager  Method: Explanation, Demonstration, Handout  Response: Verbalizes Understanding, Demonstrated Understanding    Body Mechanics, taught by Che Izquierdo PT at 8/28/2024  1:39 PM.  Learner: Family, Significant Other, Patient  Readiness: Eager  Method: Explanation, Demonstration, Handout  Response: Verbalizes Understanding, Demonstrated Understanding    Home Exercise Program, taught by Che Izquierdo PT at  8/28/2024  1:39 PM.  Learner: Family, Significant Other, Patient  Readiness: Eager  Method: Explanation, Demonstration, Handout  Response: Verbalizes Understanding, Demonstrated Understanding    Mobility Training, taught by Che Izquierdo PT at 8/28/2024  1:39 PM.  Learner: Family, Significant Other, Patient  Readiness: Eager  Method: Explanation, Demonstration, Handout  Response: Verbalizes Understanding, Demonstrated Understanding    Education Comments  No comments found.        Che Izquierdo PT, DPT

## 2024-08-28 NOTE — ANESTHESIA PREPROCEDURE EVALUATION
Patient: Jose Garrett    Procedure Information       Date/Time: 08/28/24 0700    Procedure: OPEN TOTAL KNEE ARTHROPLASTY (DHARA ROBOTIC ASSISTED DEVICE, RADHA TRIATHLON ( PS, TS POLY AVAILABLE )) ** Rapid Recovery ** (Right: Knee)    Location: [facility] 08 / Virtual [facility]    Surgeons: Sherwin Abernathy DO          Past Medical History:   Diagnosis Date    Arthritis     Asthma (HHS-HCC)     BPH (benign prostatic hyperplasia)     CKD (chronic kidney disease)     Hyponatremia     SIADH (syndrome of inappropriate ADH production) (Multi)     Sleep apnea     Type 2 diabetes mellitus (Multi)      Past Surgical History:   Procedure Laterality Date    HERNIA REPAIR      w/ mesh    LUMBAR LAMINECTOMY      L3-4       Relevant Problems   Anesthesia (within normal limits)      Pulmonary   (+) Asthma (HHS-HCC)   (+) CORTES (obstructive sleep apnea)      /Renal  SIADH   (+) Chronic renal insufficiency      Endocrine   (+) Diabetes mellitus, type 2 (Multi)       Clinical information reviewed:   Tobacco  Allergies  Meds   Med Hx  Surg Hx   Fam Hx  Soc Hx        NPO Detail:  NPO/Void Status  Carbohydrate Drink Given Prior to Surgery? : N  Date of Last Liquid: 08/27/24  Time of Last Liquid: 2100  Date of Last Solid: 08/27/24  Time of Last Solid: 2100  Last Intake Type: Clear fluids  Time of Last Void: 0430         Physical Exam    Airway  Mallampati: II  TM distance: >3 FB  Neck ROM: full     Cardiovascular    Dental    Pulmonary    Abdominal            Anesthesia Plan    History of general anesthesia?: yes  History of complications of general anesthesia?: no    ASA 3     spinal and regional     The patient is not a current smoker.  Patient was not previously instructed to abstain from smoking on day of procedure.  Patient did not smoke on day of procedure.  Education provided regarding risk of obstructive sleep apnea.  intravenous induction   Postoperative administration of opioids is intended.  Anesthetic plan and risks discussed  with patient.  Use of blood products discussed with patient who consented to blood products.    Plan discussed with CRNA and CAA.

## 2024-08-28 NOTE — ANESTHESIA POSTPROCEDURE EVALUATION
Patient: Jose Garrett    Procedure Summary       Date: 08/28/24 Room / Location: [facility] / [facility]    Anesthesia Start: 0659 Anesthesia Stop: 0909    Procedure: RIGHT OPEN TOTAL KNEE ARTHROPLASTY (Right: Knee) Diagnosis:       Primary osteoarthritis of right knee      (Primary osteoarthritis of right knee [M17.11])    Surgeons: Sherwin Abernathy DO Responsible Provider: Grant Baum DO    Anesthesia Type: spinal, regional ASA Status: 3            Anesthesia Type: spinal, regional    Vitals Value Taken Time   /61 08/28/24 0931   Temp 35.7 °C (96.3 °F) 08/28/24 0910   Pulse 63 08/28/24 0931   Resp 13 08/28/24 0931   SpO2 96 % 08/28/24 0931   Vitals shown include unfiled device data.    Anesthesia Post Evaluation    Patient location during evaluation: bedside  Patient participation: complete - patient participated  Level of consciousness: awake and alert  Pain management: adequate  Multimodal analgesia pain management approach  Airway patency: patent  Two or more strategies used to mitigate risk of obstructive sleep apnea  Cardiovascular status: acceptable  Respiratory status: acceptable  Hydration status: acceptable  Postoperative Nausea and Vomiting: none        No notable events documented.

## 2024-08-29 NOTE — SIGNIFICANT EVENT
Thank you for taking my call today regarding your recent joint replacement surgery with Dr. Sherwin Abernathy.      We discussed that: Your pain is Controlled on the current regimen Will fluctuate throughout recovery with increased activity You are able to tolerate regular activity and exercises The importance of continued cold therapy throughout recovery The importance of following the prescribed precautions by your surgeon You have not had a bowel movement, and we discussed the importance of a well balanced diet, hydration, and continued use of stool softener/laxative as prescribed The importance of continuing blood thinner as prescribed The importance of wearing compression stockings as prescribed Outpatient Physical Therapy is scheduled to begin: Tuesday 9/3    You indicated that all of your questions have been answered at the time of our call.    Please don't hesitate to reach out if you have any additional questions or concerns.    Hina Granados MBA, BSN, RN-BC  DELFINO FoleyN, RN  [facility]  [facility] [phone]

## 2024-08-29 NOTE — PROGRESS NOTES
Met with Patient and Care Partner at bedside- Patient is s/p Right Total Knee Replacement with Dr. Sherwin Abernathy.  Discussion with patient included education on the following topics: TJR Education: Wound Care (Bandage Care & Removal, Personal Hygiene & Infection Prevention), Post-Op Activity (Home PT Regimen, Movement Precautions, Assistive Equipment & 1-2hr Movement), Post-Op Precautions (Falls, Blood Clots & Constipation), Cold-Therapy (Ice vs. Cold Therapy Machines) , Methods for Symptom Management (Pain, Nausea, Swelling & Constipation), Importance of Post-op Prescriptions, How to Obtain Medication Refills, When to Resume Driving & Who to Call, Use of imedo & Staff Contact Information, When to call 9-1-1, and When to call the Surgeon's Office.  Patient Did Complete and Live class prior to surgery.  Patient is able to verbalize understanding of class content/post-operative discussion.  Contact information was provided to patient for support and assistance during the post-operative period.    At the time of this discussion, the patient's plan includes:    Discharge Date/Disposition:  Home, Today with, Outpatient Therapy Services - To begin next week.  Discharge Needs: No Equipment Needs Identified  Medications/Pharmacy: [id number] service utilized for discharge prescriptions through [facility]

## 2024-08-30 NOTE — PROGRESS NOTES
Physical Therapy Evaluation    Patient Name: Jose Garrett  MRN: [id number]  Evaluation Date: 9/3/2024  Time Calculation  Start Time: 1105  Stop Time: 1150  Time Calculation (min): 45 min  PT Evaluation Time Entry  PT Evaluation (Moderate) Time Entry: 25     PT Therapeutic Procedures Time Entry  Therapeutic Exercise Time Entry: 15    Problem List Items Addressed This Visit             ICD-10-CM    S/P TKR (total knee replacement) not using cement, right - Primary Z96.651         Subjective    General: Patient is a 72 yo male s/p  right TKA 8/28/24.  Patient discharged POD #0. Patient independent with po HEP issued in hospital.  Patient presents for outpatient PT.  Patient dis well with transfers in an out of car.  Patient motivated to participate.       Surgery:   S/p right TKA 8/28    Precautions:  TKA    Relevant PMH:  Asthma  DM  High cholesterol  BPH  Lumbar laminectomy 2017, 2022      Pain:  At worst  8-9/10  ROM  Night time    At best    3/10  Medication  rest     Home Living:  Home type: House  Stairs: Yes- 1 entry step  Lives with: Spouse  Occupation: retired  Hobbies/activities: grand kid activities    Prior Function Per Pt/Caregiver Report:  Limited secondary to OA    Imaging:  Po xray  WFL    Objective   Posture:     Range of Motion:  Knee AROM L R   Extension 0 deg -11 deg   Flexion 120 deg 86 deg      Strength:  Knee MMT L R   Knee Flexion 4/5 4-/5   Knee Extension 4/5 3+/5         Flexibility:  + quad/HS tightness     Palpation:  Modest tenderness      Incission:  Bandaged; no drainage  ANISH hose    Circumferential Measurement:  Joint line  44 cm        Gait:  RW  Good swing through pattern        Transfers:  Good body mechanics    Outcome Measures:  WOMAC  76/96  79%    Assessment  Pt is a 73 y.o. male who presents with impairments of limited ROM, weakness and pain right knee. These impairments have led to functional limitations including difficulty walking/WB ADLS. Pt would benefit from skilled  "physical therapy intervention to improve above impairments and facilitate return to function.    Plan  2x/week  Up to 12 visits  Therapeutic Exercise  Manual  Gait training    Plan for next visit:   Progress therapeutic exercise    OP EDUCATION:  HEP    Today's Treatment:  Therapeutic Exercise  QS x 10  HS stretch 20\" x 3  SLR 2 x 10  Heel sides with ball x 20    Cold pack to right knee x 15 minutes    HEP to be completed daily, exercises include:  AP  Heel slides  GS  QS  SLR  Hip ABD/ADD    Next visit:  Progress HEP/therapeutic exercise to include standing    Goals:  STG(3 visits)  Patient to be independent with HEP    LTG(12 visits)  WOMAC to < 20 % impaired  ROM right knee to 0-120  MMT right knee/hip mm  4+/5  4.   Patient to walk without device or deviation on level                "

## 2024-09-03 ENCOUNTER — EVALUATION (OUTPATIENT)
Dept: PHYSICAL THERAPY | Facility: CLINIC | Age: 74
End: 2024-09-03
Payer: MEDICARE

## 2024-09-03 DIAGNOSIS — Z96.651 S/P TKR (TOTAL KNEE REPLACEMENT) NOT USING CEMENT, RIGHT: Primary | ICD-10-CM

## 2024-09-03 DIAGNOSIS — M17.11 UNILATERAL PRIMARY OSTEOARTHRITIS, RIGHT KNEE: ICD-10-CM

## 2024-09-03 PROCEDURE — 97162 PT EVAL MOD COMPLEX 30 MIN: CPT | Mod: GP

## 2024-09-03 PROCEDURE — 97110 THERAPEUTIC EXERCISES: CPT | Mod: GP

## 2024-09-10 NOTE — PROGRESS NOTES
"    Physical Therapy Treatment    Patient Name: Jose Garrett  MRN: [id number]  Encounter date:  9/11/2024  Time Calculation  Start Time: 1115  Stop Time: 1210  Time Calculation (min): 55 min     PT Therapeutic Procedures Time Entry  Therapeutic Exercise Time Entry: 40    Visit Number:  2 (including evaluation)  Planned total visits: 12  Visits Authorized/Insurance Coverage:  MEDICARE A/B, M[place], [place], [place] ($0 USED PT/ST)     Current Problem  Problem List Items Addressed This Visit             ICD-10-CM    S/P TKR (total knee replacement) not using cement, right - Primary Z96.651       Surgery:   S/p right TKA 8/28     Precautions:  TKA     Relevant PMH:  Asthma  DM  High cholesterol  BPH  Lumbar laminectomy 2017, 2022       Pain  3/10       Subjective  Patient in good spirits; compliant with HEP.  Patient reports overall good progress in ease of mobility.    Objective  PROM   -7- 100    Treatment:  NuStep L1 x 6 min  QS x 20-prop  HS stretch 30\" x 3  SLR 2 x 10  Ball flexion stretch x 20    Standing hip ABD 2 x 10  Standing hip EXT 2 x 10  MIP 2  x10    Side stepping along bars 8\" x 6    CP x 15    Current HEP:  AP  Heel slides  GS  QS  SLR  Hip ABD/ADD    Standing hip ABD  Standing hip EXT  MIP      Activity tolerance:  good    OP EDUCATION:  HEP     Assessment:  Pt's response to treatment:  good  Areas of improvements:  ROM/function  Limitations/deficits:  healing status    Pain end of session:   3/10    Plan:  Continue with current POC with the following changes advance as able    Assessment of current progress against goals:  Progressing toward functional goals    Goals:  STG(3 visits)  Patient to be independent with HEP     LTG(12 visits)  WOMAC to < 20 % impaired  ROM right knee to 0-120  MMT right knee/hip mm  4+/5  4.   Patient to walk without device or deviation on level     "

## 2024-09-11 ENCOUNTER — TREATMENT (OUTPATIENT)
Dept: PHYSICAL THERAPY | Facility: CLINIC | Age: 74
End: 2024-09-11
Payer: MEDICARE

## 2024-09-11 DIAGNOSIS — Z96.651 S/P TKR (TOTAL KNEE REPLACEMENT) NOT USING CEMENT, RIGHT: Primary | ICD-10-CM

## 2024-09-11 PROCEDURE — 97110 THERAPEUTIC EXERCISES: CPT | Mod: GP

## 2024-09-17 ENCOUNTER — TREATMENT (OUTPATIENT)
Dept: PHYSICAL THERAPY | Facility: CLINIC | Age: 74
End: 2024-09-17
Payer: MEDICARE

## 2024-09-17 DIAGNOSIS — Z96.651 S/P TKR (TOTAL KNEE REPLACEMENT) NOT USING CEMENT, RIGHT: ICD-10-CM

## 2024-09-17 PROCEDURE — 97110 THERAPEUTIC EXERCISES: CPT | Mod: GP,CQ

## 2024-09-17 NOTE — PROGRESS NOTES
"    Physical Therapy Treatment    Patient Name: Jose Garrett  MRN: [id number]  Encounter date:  9/17/2024  Time Calculation  Start Time: 1600  Stop Time: 1645  Time Calculation (min): 45 min     PT Therapeutic Procedures Time Entry  Therapeutic Exercise Time Entry: 40    Visit Number:  3 (including evaluation)  Planned total visits: 12  Visits Authorized/Insurance Coverage:  MEDIC[place] A/B, MMO [place], [place], [place] ($0 USED PT/ST)     Current Problem  Problem List Items Addressed This Visit             ICD-10-CM    S/P TKR (total knee replacement) not using cement, right Z96.651         Surgery:   S/p right TKA 8/28     Precautions:  TKA     Relevant PMH:  Asthma  DM  High cholesterol  BPH  Lumbar laminectomy 2017, 2022       Pain  4-5/10 anterior > posterior        Subjective  No prolonged pain after last session.  Muscle spasms continue in right thigh    Objective  FWW with amb into gym  RLE ANISH hose donned with steri strips visible   supine RLE    PROM heel prop -2 ext  AROM 104 flexion     Treatment:  NuStep L1 x 6 min - with UE   Heel slides AAROM on board x 10   Heel slides AAROM on board with strap for OP x 10   QS 3 sec hold x 20-prop 1/2 bolster  HS stretch 30\" x 3  SLR 2 x 10 R/L   Hamstring stretch with strap 15 sec x 2 R/L   Ball flexion stretch x 20 3 sec hold on seconds set   SAQ 2x10 R/L     Rail: BUE    HR x15 - eccentric focus   Toe raises x 15   Mini squats x 10   MIP x10  hip ABD 2 x 10 R/L   hip EXT 2 x 10    Current HEP:  AP  Heel slides  GS  QS  SLR  Hip ABD/ADD    Standing hip ABD  Standing hip EXT  MIP      Activity tolerance:  good    OP EDUCATION:  Ice and elevate      Assessment:   Good response to session.   Areas of improvements:  AROM and PROM  Limitations/deficits:  Weakness, Pain limits ADL's, and ROM limited and affects function    Pain end of session:  5/10     Plan:     Continue with current POC/no changes    Assessment of current progress against goals:  Progressing toward functional " goals     Goals:  STG(3 visits)  Patient to be independent with HEP     LTG(12 visits)  WOMAC to < 20 % impaired  ROM right knee to 0-120  MMT right knee/hip mm  4+/5  4.   Patient to walk without device or deviation on level

## 2024-09-19 ENCOUNTER — TREATMENT (OUTPATIENT)
Dept: PHYSICAL THERAPY | Facility: CLINIC | Age: 74
End: 2024-09-19
Payer: MEDICARE

## 2024-09-19 DIAGNOSIS — Z96.651 S/P TKR (TOTAL KNEE REPLACEMENT) NOT USING CEMENT, RIGHT: ICD-10-CM

## 2024-09-19 PROCEDURE — 97140 MANUAL THERAPY 1/> REGIONS: CPT | Mod: CQ,GP | Performed by: SPECIALIST/TECHNOLOGIST

## 2024-09-19 PROCEDURE — 97110 THERAPEUTIC EXERCISES: CPT | Mod: CQ,GP | Performed by: SPECIALIST/TECHNOLOGIST

## 2024-09-19 NOTE — PROGRESS NOTES
"    Physical Therapy Treatment    Patient Name: Jose Garrett  MRN: [id number]  Encounter date:  9/19/2024  Encounter date:  9/17/2024  Time Calculation  Start Time: 1202  Stop Time: 1247  Time Calculation (min): 45 min  PT Therapeutic Procedures Time Entry  Therapeutic Exercise Time Entry: 30 min  Manual: 10 min    Visit Number:  4 (including evaluation)  Planned total visits: 12  Visits Authorized/Insurance Coverage:  [place] A/B, [place], [place] ($0 USED PT/ST)     Current Problem  Problem List Items Addressed This Visit             ICD-10-CM    S/P TKR (total knee replacement) not using cement, right Z96.651     Surgery:   S/p right TKA 8/28     Precautions:  TKA     Relevant PMH:  Asthma  DM  High cholesterol  BPH  Lumbar laminectomy 2017, 2022     Pain  4-5/10 anterior > posterior     Subjective     Patient relay intermittent R quad muscle spasms from an unknown cause, stating they usually develop \"right around 8 o'clock\" and last for an unknown duration, relaying they interrupted his sleep until approximately 3 am. Patient relays taking muscle relaxers to attempt to alleviate the spasms, with moderate effect.     Objective  Ambulated into gym w/ FWW walker, good upright posture. R knee PROM: 0-104 deg    Treatment:  STM: R distal quad proximal/superior to first steri strip, Pt supine, BLE over bolster,     Therapeutic exercises:   NuStep L1 x 6 min - with UE     Supine  Heel slides AROM on board x 10   Heel slides AAROM on board with strap for OP x 10   QS 3 sec hold x 20-prop 1/2 bolster  HS stretch 30\" x 3  SLR 2 x 10 R/L   Hamstring stretch with strap 15 sec x 2 R/L   Ball flexion stretch x 20 3 sec hold on seconds set   SAQ 2x10 R/L     DNP 9/19  Rail: BUE    HR x15 - eccentric focus   Toe raises x 15   Mini squats x 10   MIP x10  hip ABD 2 x 10 R/L   hip EXT 2 x 10    Current HEP:  AP  Heel slides  GS  QS  SLR  Hip ABD/ADD    Standing hip ABD  Standing hip EXT  MIP      Activity " tolerance:  Good    OP EDUCATION:  Ice and elevate     Assessment:      STM performed to R quad to address night time spasms, good response, pain decreased to 1/10. Improved performance of therex, minimal cueing required for performance of relax/contract sequence during RLE. RLE pain returned to pre-STM level following therex, noted post exercise fatigue.     Pt's response to treatment: Good    Areas of improvements: Improved PROM extension, decreased R quad tension with STM.  Limitations/deficits:  Weakness, Pain limits ADL's, and ROM limited and affects function    Pain end of session:  4/10     Plan:    Continue with current POC/no changes Resume standing therex     Assessment of current progress against goals:  Progressing toward functional goals     Goals:  STG(3 visits)  Patient to be independent with HEP     LTG(12 visits)  WOMAC to < 20 % impaired  ROM right knee to 0-120  MMT right knee/hip mm  4+/5  4.   Patient to walk without device or deviation on level

## 2024-09-23 NOTE — PROGRESS NOTES
"    Physical Therapy Treatment    Patient Name: Jose Garrett  MRN: [id number]  Encounter date:  9/24/2024  Time Calculation  Start Time: 1204  Stop Time: 1249  Time Calculation (min): 45 min  PT Therapeutic Procedures Time Entry  Therapeutic Exercise Time Entry: 30 min  Manual: 10 min    Visit Number:  5 (including evaluation)  Planned total visits: 12  Visits Authorized/Insurance Coverage:  MEDICARE A/B, [place], [place] ($0 USED PT/ST)     Current Problem  Problem List Items Addressed This Visit             ICD-10-CM    S/P TKR (total knee replacement) not using cement, right Z96.651       Surgery:   S/p right TKA 8/28     Precautions:  TKA     Relevant PMH:  Asthma  DM  High cholesterol  BPH  Lumbar laminectomy 2017, 2022     Pain  4-5/10 anterior > posterior     Subjective       Patient reports decreased RLE/quad cramping since last visit, noting he has not been kept up by cramping and has been able to sleep through the night.     Objective     Continues to use FWW for ambulation, compression stocking to up above R knee donned, lowered to just above incision during STM.  R knee PROM: 0-105 deg    Treatment:  STM: R distal quad proximal/superior to first steri strip, Pt supine, BLE over bolster,     Therapeutic exercises:   NuStep L1 x 6 min - with UE     Supine  Heel slides AROM on board x 10   Heel slides AAROM on board with strap for OP x 10   QS 3 sec hold x 20- prop 1/2 bolster  HS stretch 30\" x 3 DNP 9/24  SLR 2 x 10 R/L   Hamstring stretch with strap 20 sec x 2 R/L   Ball flexion stretch x 20 3 sec hold on seconds set DNP 9/24   SAQ 2x10 R/L   Parallel bars: BUE    HR x15 - eccentric focus   Toe raises x 15   Mini squats x 10   MIP x10  hip ABD 2 x 10 R/L   hip EXT 2 x 10 R/L    Current HEP:  AP  Heel slides  GS  QS  SLR  Hip ABD/ADD    Standing hip ABD  Standing hip EXT  MIP    Activity tolerance:  Good    OP EDUCATION:  Ice and elevate     Assessment:      Repeated STM to R quad, good response, pain " decreased to 2/10. Resumed standing therex not performed in the previous treatment, BUE support on parallel bars, good response. Minimal cueing needed for exercise instruction. Increased RLE pain and post exercise fatigue, patient voices satisfaction with treatment.       Pt's response to treatment: Good    Areas of improvements:  Decreased R quad tension with STM, Improved RLE strength w/ WB  Limitations/deficits:  RLE weakness, R knee ROM decreased vs LLE limiting ability to perform ADLs     Pain end of session:  4/10     Plan:    Continue with current POC/no changes     Assessment of current progress against goals:  Progressing toward functional goals     Goals:  STG(3 visits)  Patient to be independent with HEP     LTG(12 visits)  WOMAC to < 20 % impaired  ROM right knee to 0-120  MMT right knee/hip mm  4+/5  4.   Patient to walk without device or deviation on level

## 2024-09-24 ENCOUNTER — TREATMENT (OUTPATIENT)
Dept: PHYSICAL THERAPY | Facility: CLINIC | Age: 74
End: 2024-09-24
Payer: MEDICARE

## 2024-09-24 DIAGNOSIS — Z96.651 S/P TKR (TOTAL KNEE REPLACEMENT) NOT USING CEMENT, RIGHT: ICD-10-CM

## 2024-09-24 PROCEDURE — 97140 MANUAL THERAPY 1/> REGIONS: CPT | Mod: CQ,GP | Performed by: SPECIALIST/TECHNOLOGIST

## 2024-09-24 PROCEDURE — 97110 THERAPEUTIC EXERCISES: CPT | Mod: CQ,GP | Performed by: SPECIALIST/TECHNOLOGIST

## 2024-09-25 NOTE — PROGRESS NOTES
"    Physical Therapy Treatment    Patient Name: Jose Garrett  MRN: [id number]  Encounter date:  9/26/2024  Time Calculation  Start Time: 1415  Stop Time: 1500  Time Calculation (min): 45 min     PT Therapeutic Procedures Time Entry  Manual Therapy Time Entry: 10  Therapeutic Exercise Time Entry: 32    Visit Number:  6 (including evaluation)  Planned total visits: 10  Visits Authorized/Insurance Coverage:  [place] A/B, [place] ($0 USED PT/ST)     Current Problem  Problem List Items Addressed This Visit             ICD-10-CM    S/P TKR (total knee replacement) not using cement, right - Primary Z96.651       Surgery:   S/p right TKA 8/28  4 weeks po 9/25     Precautions:  TKA     Relevant PMH:  Asthma  DM  High cholesterol  BPH  Lumbar laminectomy 2017, 2022       Pain  2/10       Subjective  General  Patient in good spirits and eager to participate.  Patient reports less night time pain allowing for improved sleep quality.  Patient to ortho 10/10    Objective  PROM right knee 0- 111  Gait speed increasing without LOB    Treatment:  STM: R distal quad proximal/superior to first steri strip, Pt supine, BLE over bolster,      Therapeutic exercises:   NuStep L2  x 6 min - with UE      Supine  Heel slides AROM on board x 10 - DNP  Heel slides AAROM on board with strap for OP x 10 - DNP  QS 3 sec hold x 20- prop 1/2 bolster  HS stretch 30\" x 3   SLR 2 x 10 R/L   Hamstring stretch with strap 20 sec x 2 R/L - DNP  Ball flexion stretch x 20 3 sec hold on seconds set DNP 9/24   SAQ 2x10 R/L     Parallel bars: BUE    HR x 20 - eccentric focus   Toe raises x 20   Mini squats x 10   MIP x 20  hip ABD 2 x 10 R/L   hip EXT 2 x 10 R/L  Carmela step overs  FWD/LAT x 5     Current HEP:  AP  Heel slides  GS  QS  SLR  Hip ABD/ADD     Standing hip ABD  Standing hip EXT  MIP    Activity tolerance:  good    OP EDUCATION:  HEP    Assessment:  Pt's response to treatment:  good  Areas of improvements:  pain/spasm right " quad  Limitations/deficits:  endurance    Pain end of session:   3-4/10    Plan:  Continue with current POC with the following changes advance as able    Assessment of current progress against goals:  Progressing toward functional goals    Goals:  STG(3 visits)  Patient to be independent with HEP     LTG(12 visits)  WOMAC to < 20 % impaired  ROM right knee to 0-120  MMT right knee/hip mm  4+/5  4.   Patient to walk without device or deviation on level

## 2024-09-26 ENCOUNTER — TREATMENT (OUTPATIENT)
Dept: PHYSICAL THERAPY | Facility: CLINIC | Age: 74
End: 2024-09-26
Payer: MEDICARE

## 2024-09-26 DIAGNOSIS — Z96.651 S/P TKR (TOTAL KNEE REPLACEMENT) NOT USING CEMENT, RIGHT: Primary | ICD-10-CM

## 2024-09-26 PROCEDURE — 97110 THERAPEUTIC EXERCISES: CPT | Mod: GP

## 2024-09-26 PROCEDURE — 97140 MANUAL THERAPY 1/> REGIONS: CPT | Mod: GP

## 2024-09-30 NOTE — PROGRESS NOTES
"    Physical Therapy Treatment    Patient Name: Jose Garrett  MRN: [id number]  Encounter date:  10/1/2024  Time Calculation  Start Time: 1200  Stop Time: 1245  Time Calculation (min): 45 min  PT Therapeutic Procedures Time Entry  Manual Therapy Time Entry: 10  Therapeutic Exercise Time Entry: 32    Visit Number:  7 (including evaluation)  Planned total visits: 10  Visits Authorized/Insurance Coverage:  [place] A/B, [place], [place] ($0 USED PT/ST)     Current Problem  Problem List Items Addressed This Visit             ICD-10-CM    S/P TKR (total knee replacement) not using cement, right Z96.651     Surgery:   S/p right TKA 8/28  4 weeks po 9/25     Precautions:  TKA     Relevant PMH:  Asthma  DM  High cholesterol  BPH  Lumbar laminectomy 2017, 2022     Pain  2/10    Subjective  General  Patient denies R quad tightness, noting the R quad cramps still occur, but happen later at night and do not last as long.     Objective    B hamstring tightness during stretches, R>L,  Ambulates with FWW, gait speed increasing.     Treatment:  STM: R distal quad proximal/superior to first steri strip, Pt supine, BLE over bolster,      Therapeutic exercises:   NuStep L2  x 6 min - 2.5 min LE only, 3.5 w/ UE      Supine  Heel slides AROM on board x 10 - DNP  Heel slides AAROM on board with strap for OP x 10 - DNP  QS 3 sec hold x 20- prop 1/2 bolster  HS stretch 30\" x 3   SLR 2 x 10 R/L   Hamstring stretch with strap 20 sec x 2 R/L - DNP  Ball flexion stretch x 20 3 sec hold on seconds set   SAQ 2x10 R/L     Parallel bars: BUE    HR x 20 - eccentric focus   Toe raises x 20   Mini squats x 20   MIP x 20 (10 ea)  hip ABD 2 x 10 R/L   hip EXT 2 x 10 R/L  Carmela step overs  FWD/LAT x 5, alternated     Current HEP:  AP  Heel slides  GS  QS  SLR  Hip ABD/ADD     Standing hip ABD  Standing hip EXT  MIP    Activity tolerance:  good    OP EDUCATION:  HEP    Assessment:      Patient is improving in BLE strength and activity endurance, " potential to increase resistance used during therex. Hamstring flexibility deficits remain in BLE, R>L. Verbal and tactile cueing provided to relax LE during stretches, fair follow through. Reduced RLE pain and tightness following STM. Increased post exercise RLE fatigue and soreness vs pain.     Pt's response to treatment:  Good   Areas of improvements: Spasms in R quad decreased in both duration and frequency.   Limitations/deficits: Decreased endurance with ADLs     Pain end of session:   2-3/10    Plan:  Continue with current POC with the following changes advance as able    Assessment of current progress against goals:  Progressing toward functional goals    Goals:  STG(3 visits)  Patient to be independent with HEP     LTG(12 visits)  WOMAC to < 20 % impaired  ROM right knee to 0-120  MMT right knee/hip mm  4+/5  4.   Patient to walk without device or deviation on level

## 2024-10-01 ENCOUNTER — TREATMENT (OUTPATIENT)
Dept: PHYSICAL THERAPY | Facility: CLINIC | Age: 74
End: 2024-10-01
Payer: MEDICARE

## 2024-10-01 DIAGNOSIS — Z96.651 S/P TKR (TOTAL KNEE REPLACEMENT) NOT USING CEMENT, RIGHT: ICD-10-CM

## 2024-10-01 PROCEDURE — 97140 MANUAL THERAPY 1/> REGIONS: CPT | Mod: CQ,GP | Performed by: SPECIALIST/TECHNOLOGIST

## 2024-10-01 PROCEDURE — 97110 THERAPEUTIC EXERCISES: CPT | Mod: CQ,GP | Performed by: SPECIALIST/TECHNOLOGIST

## 2024-10-03 NOTE — PROGRESS NOTES
"    Physical Therapy Treatment    Patient Name: Jose Garrett  MRN: [id number]  Encounter date:  10/4/2024             Visit Number:  8 (including evaluation)  Planned total visits: 10  Visits Authorized/Insurance Coverage:  [place] A/B[place] ($0 USED PT/ST)     Current Problem  Problem List Items Addressed This Visit             ICD-10-CM    S/P TKR (total knee replacement) not using cement, right - Primary Z96.651       Surgery:   S/p right TKA 8/28  4 weeks po 9/25     Precautions:  TKA       Pain  2-3/10    Subjective  General  Patient reports variable levels of pain at night; less than previous.  Patient walking in home without device at times.  Patient to ortho 10/9    Objective  Gait smooth and fluid with cane    Treatment:  Manual:  STM: R distal quad proximal/superior to first steri strip, Pt supine, BLE over bolster,      Therapeutic exercises:   NuStep L2  x 6 min - 2.5 min LE only, 3.5 w/ UE      Supine  Heel slides AROM on board x 10 - DNP  Heel slides AAROM on board with strap for OP x 10 - DNP  QS 3 sec hold x 20- prop 1/2 bolster  HS stretch 30\" x 3   SLR 2 x 10 R/L   Hamstring stretch with strap 20 sec x 2 R/L - DNP  Ball flexion stretch x 20 3 sec hold on seconds set   SAQ 2x10 R/L      Parallel bars: BUE    HR x 20 - eccentric focus   Toe raises x 20   Mini squats x 20   MIP x 20 (10 ea)  hip ABD 2 x 10 R/L   hip EXT 2 x 10 R/L  Carmela step overs  FWD/LAT x 5, alternated    *used straight cane to navigate in department     Current HEP:  AP  Heel slides  GS  QS  SLR  Hip ABD/ADD     Standing hip ABD  Standing hip EXT  MIP     Activity tolerance:  good    OP EDUCATION:  HEP    Assessment:  Pt's response to treatment:  good  Areas of improvements:  gait quality  Limitations/deficits:  PM spasms    Pain end of session:   0/10    Plan:  Continue with current POC with the following changes  advance as able    Assessment of current progress against goals:  Progressing toward functional " goals      Goals:  STG(3 visits)  Patient to be independent with HEP     LTG(12 visits)  WOMAC to < 20 % impaired  ROM right knee to 0-120  MMT right knee/hip mm  4+/5  4.   Patient to walk without device or deviation on level

## 2024-10-04 ENCOUNTER — TREATMENT (OUTPATIENT)
Dept: PHYSICAL THERAPY | Facility: CLINIC | Age: 74
End: 2024-10-04
Payer: MEDICARE

## 2024-10-04 DIAGNOSIS — Z96.651 S/P TKR (TOTAL KNEE REPLACEMENT) NOT USING CEMENT, RIGHT: Primary | ICD-10-CM

## 2024-10-04 PROCEDURE — 97110 THERAPEUTIC EXERCISES: CPT | Mod: GP

## 2024-10-04 PROCEDURE — 97140 MANUAL THERAPY 1/> REGIONS: CPT | Mod: GP

## 2024-10-07 ENCOUNTER — TREATMENT (OUTPATIENT)
Dept: PHYSICAL THERAPY | Facility: CLINIC | Age: 74
End: 2024-10-07
Payer: MEDICARE

## 2024-10-07 DIAGNOSIS — Z96.651 S/P TKR (TOTAL KNEE REPLACEMENT) NOT USING CEMENT, RIGHT: Primary | ICD-10-CM

## 2024-10-07 PROCEDURE — 97140 MANUAL THERAPY 1/> REGIONS: CPT | Mod: GP

## 2024-10-07 PROCEDURE — 97110 THERAPEUTIC EXERCISES: CPT | Mod: GP

## 2024-10-07 NOTE — PROGRESS NOTES
"    Physical Therapy Treatment    Patient Name: Jose Garrett  MRN: [id number]  Encounter date:  10/7/2024  Time Calculation  Start Time: 1240  Stop Time: 1324  Time Calculation (min): 44 min     PT Therapeutic Procedures Time Entry  Manual Therapy Time Entry: 10  Therapeutic Exercise Time Entry: 30    Visit Number:  9 (including evaluation)  Planned total visits: 10  Visits Authorized/Insurance Coverage:  [place] A/B, [place] ($0 USED PT/ST)     Current Problem  Problem List Items Addressed This Visit             ICD-10-CM    S/P TKR (total knee replacement) not using cement, right - Primary Z96.651       Surgery:   S/p right TKA 8/28  4 weeks po 9/25     Precautions:  TKA       Pain  0/10    Subjective  General  Patient in good spirits; \"sleeping a little better\".  Patient compliant with HEP.    Objective  Good swing through gait pattern with cane      Treatment:  Manual:  STM: R distal quad proximal/superior to first steri strip, Pt supine, BLE over bolster,      Therapeutic exercises:   NuStep L2  x 6 min seat     Supine  Heel slides AROM on board x 10 - DNP  Heel slides AAROM on board with strap for OP x 10 - DNP  QS 3 sec hold x 20- prop 1/2 bolster- DNP  HS stretch 30\" x 3   SLR 2 x 10 R/L   Ball flexion stretch x 20 3 sec hold on seconds set   SAQ 2x10 R/L      Parallel bars: BUE    HR x 20 - eccentric focus   Toe raises x 20   Mini squats x 20   MIP x 20 (10 ea)  hip ABD 2 x 10 R/L   hip EXT 2 x 10 R/L  Carmela step overs  FWD/LAT x 5, alternated  Step 4\" x 10 bilateral  Foam step tap 8\" x 20     *used straight cane to navigate in department     Current HEP:  AP  Heel slides  GS  QS  SLR  Hip ABD/ADD     Standing hip ABD  Standing hip EXT  MIP     Activity tolerance:  Good    OP EDUCATION:  HEP    Assessment:  Pt's response to treatment:  good  Areas of improvements:  endurance  Limitations/deficits:  spasm quad    Pain end of session:   0/10    Plan:  Continue with current POC with the " following changes good    Assessment of current progress against goals:  Progressing toward functional goals    Goals:  STG(3 visits)  Patient to be independent with HEP     LTG(12 visits)  WOMAC to < 20 % impaired  ROM right knee to 0-120  MMT right knee/hip mm  4+/5  4.   Patient to walk without device or deviation on level

## 2024-10-08 NOTE — PROGRESS NOTES
"    Physical Therapy Treatment    Patient Name: Jose Garrett  MRN: [id number]  Encounter date:  10/9/2024  Time Calculation  Start Time: 1330  Stop Time: 1415  Time Calculation (min): 45 min     PT Therapeutic Procedures Time Entry  Manual Therapy Time Entry: 10  Therapeutic Exercise Time Entry: 30    Visit Number:  10 (including evaluation)  Planned total visits: 12  Visits Authorized/Insurance Coverage:  [place] A/B, [place], [place] ($0 USED PT/ST)     Current Problem  Problem List Items Addressed This Visit             ICD-10-CM    S/P TKR (total knee replacement) not using cement, right Z96.651       Surgery:   S/p right TKA 8/28     Precautions:  TKA     Relevant PMH:  Asthma  DM  High cholesterol  BPH  Lumbar laminectomy 2017, 2022       Pain  0/10  No spasms at night    Subjective  General  Patient to ortho 10/10.  Patient pleased with progress; using cane in home.  Patient diligent with HEP.    Objective  PROM right knee  0-120    Right  Quad 4+/5  HS 4+/5  Hip abduction 4+/5    Treatment:  Manual:  STM: R distal quad proximal/superior to first steri strip, Pt supine, BLE over bolster- used roller       Therapeutic exercises:   NuStep L2  x 6 min seat     Supine  Heel slides AROM on board x 10 - DNP  Heel slides AAROM on board with strap for OP x 10 - DNP  QS 3 sec hold x 20- prop 1/2 bolster- DNP  HS stretch 30\" x 3   SLR 2 x 10 R/L   Ball flexion stretch x 20 3 sec hold on seconds set   SAQ 2x10 R/L      Parallel bars: BUE    HR x 20 - eccentric focus   Toe raises x 20   Mini squats x 20   MIP x 20 (10 ea)  hip ABD 2 x 10 R/L   hip EXT 2 x 10 R/L  Carmela step overs  FWD/LAT x 5, alternated  Step 4\" x 10 bilateral  Foam step tap 8\" x 20     *used straight cane to navigate in department    HEP to be completed daily, exercises include:  AP  Heel slides  GS  QS  SLR  Hip ABD/ADD    Activity tolerance:  good    OP EDUCATION:  HEP  Cane ambulation in home    Assessment:  Pt's response to treatment:  " good  Areas of improvements:  ROM/function  Limitations/deficits:  endurance    Pain end of session:   0/10    Plan:  Continue with current POC with the following changes x 2 additional visits    Assessment of current progress against goals:  Progressing toward functional goals    Goals:  STG(3 visits)  Patient to be independent with HEP     LTG(12 visits)  WOMAC to < 20 % impaired- NE  ROM right knee to 0-120- MET  MMT right knee/hip mm  4+/5- MET  4.   Patient to walk without device or deviation on level- PROGRESSING

## 2024-10-09 ENCOUNTER — TREATMENT (OUTPATIENT)
Dept: PHYSICAL THERAPY | Facility: CLINIC | Age: 74
End: 2024-10-09
Payer: MEDICARE

## 2024-10-09 DIAGNOSIS — Z96.651 S/P TKR (TOTAL KNEE REPLACEMENT) NOT USING CEMENT, RIGHT: ICD-10-CM

## 2024-10-09 PROCEDURE — 97110 THERAPEUTIC EXERCISES: CPT | Mod: GP

## 2024-10-09 PROCEDURE — 97140 MANUAL THERAPY 1/> REGIONS: CPT | Mod: GP

## 2024-10-10 ENCOUNTER — APPOINTMENT (OUTPATIENT)
Dept: PHYSICAL THERAPY | Facility: CLINIC | Age: 74
End: 2024-10-10
Payer: MEDICARE

## 2024-10-14 NOTE — PROGRESS NOTES
"    Physical Therapy Treatment    Patient Name: Jose Garrett  MRN: [id number]  Encounter date:  10/15/2024  Time Calculation  Start Time: 1200  Stop Time: 1245  Time Calculation (min): 45 min  PT Therapeutic Procedures Time Entry  Therapeutic Exercise Time Entry: 42    Visit Number:  11 (including evaluation)  Planned total visits: 12  Visits Authorized/Insurance Coverage:  [place] A/B, [place], [place] ($0 USED PT/ST)     Current Problem  Problem List Items Addressed This Visit             ICD-10-CM    S/P TKR (total knee replacement) not using cement, right Z96.651     Surgery:   S/p right TKA 8/28     Precautions:  TKA     Relevant PMH:  Asthma  DM  High cholesterol  BPH  Lumbar laminectomy 2017, 2022     Pain  0/10  No spasms at night    Subjective  General  Patient came into the appointment reporting decreased occasion and severity of night time cramping in the RLE. Patient notes he has been using the SPC more at home vs walker    Objective  PROM: R knee: 0-121     RLE weaker than LLE on supine SLR.     Treatment:  Manual:  STM: R distal quad proximal/superior to first steri strip, Pt supine, BLE over bolster- used roller DNP 10/15     Therapeutic exercises:   NuStep L2  x 6 min, seat 6, LE only      Supine  Heel slides AROM on board x 10- DNP 10/15  Heel slides AAROM on board with strap for OP x 10 - DNP 10/15  QS 3 sec hold x 20- prop 1/2 bolster- DNP 10/15  HS stretch 30\" x 3- seated ea  SLR 2 x 10 R/L   Ball flexion stretch x 20, 3 sec hold on    SAQ 20 R/L- #3      Seated:   LAQ x 20 , #3  Hamstring curls x 20 ea, Green TB (in envelope)    At high table or parallel bars: BUE    HR x 20 - eccentric focus   Toe raises x 20   Mini squats x 20   MIP x 20 ea  hip ABD 2 x 10 R/L   hip EXT 2 x 10 R/L  Carmela step overs  FWD/LAT x 5, alternated, 3 laps each, hit hurdles x 2 walking forward.  Step up to 6\" x 10 bilateral  Foam step tap 8\" x 20     *used straight cane to navigate in department    HEP to be " "completed daily, exercises include:  AP  Heel slides  GS  QS  SLR  Hip ABD/ADD    Activity tolerance:  good    OP EDUCATION:  HEP  Cane ambulation in home    Assessment:       STM not performed because RLE cramps at night have decreased in occurrence and severity. Exercises were progressed to include more seated activity and increased resistance, good response. Fair performance on logan step overs. Performed two laps of forward step overs with head down looking at feet. Verbally cued to keep head up on third lap, fair performance, logan contact x 2. Lateral step over performance w/ head up was better than forward. Both performed with BUE support on rails. SBA on step ups to 6\" and taps standing on Airex, fair performance, hit foot on step x 2, self corrected and caught balance with JOS on table. Verbal cued to slow down and concentrate on exercise performance. BLE fatigue vs pain after therex.     Pt's response to treatment: Good  Areas of improvements: Improved ability to ambulate, used cane vs FWW   Limitations/deficits:  Decreased endurance during ADLs and RLE weakness that is improving    Pain end of session:   0/10    Plan:  Continue with current POC with the following changes x 1 additional visit-discharge next visit.    Assessment of current progress against goals:  Progressing toward functional goals    Goals:  STG(3 visits)  Patient to be independent with HEP     LTG(12 visits)  WOMAC to < 20 % impaired- NE  ROM right knee to 0-120- MET  MMT right knee/hip mm  4+/5- MET  4.   Patient to walk without device or deviation on level- PROGRESSING     "

## 2024-10-15 ENCOUNTER — TREATMENT (OUTPATIENT)
Dept: PHYSICAL THERAPY | Facility: CLINIC | Age: 74
End: 2024-10-15
Payer: MEDICARE

## 2024-10-15 DIAGNOSIS — Z96.651 S/P TKR (TOTAL KNEE REPLACEMENT) NOT USING CEMENT, RIGHT: ICD-10-CM

## 2024-10-15 PROCEDURE — 97110 THERAPEUTIC EXERCISES: CPT | Mod: CQ,GP | Performed by: SPECIALIST/TECHNOLOGIST

## 2024-10-17 ENCOUNTER — TREATMENT (OUTPATIENT)
Dept: PHYSICAL THERAPY | Facility: CLINIC | Age: 74
End: 2024-10-17
Payer: MEDICARE

## 2024-10-17 DIAGNOSIS — Z96.651 S/P TKR (TOTAL KNEE REPLACEMENT) NOT USING CEMENT, RIGHT: Primary | ICD-10-CM

## 2024-10-17 PROCEDURE — 97110 THERAPEUTIC EXERCISES: CPT | Mod: GP

## 2024-10-17 NOTE — PROGRESS NOTES
"    Physical Therapy Treatment    Patient Name: Jose Garrett  MRN: [id number]  Encounter date:  10/17/2024  Time Calculation  Start Time: 1103  Stop Time: 1140  Time Calculation (min): 37 min     PT Therapeutic Procedures Time Entry  Therapeutic Exercise Time Entry: 33    Visit Number:  12 (including evaluation)  Planned total visits: 12  Visits Authorized/Insurance Coverage:  MEDICARE A/B, MMO [place], [place], [place] ($0 USED PT/ST)     Current Problem  Problem List Items Addressed This Visit             ICD-10-CM    S/P TKR (total knee replacement) not using cement, right - Primary Z96.651     Surgery:   S/p right TKA 8/28     Precautions:  TKA     Relevant PMH:  Asthma  DM  High cholesterol  BPH  Lumbar laminectomy 2017, 2022          Pain  0/10    Subjective  General  Patient in good spirits and motivated to participate.  Patient verbalize preparedness for discharge.    Objective  PROM right knee   0-120    WOMAC  18/80    Treatment:      Therapeutic exercises:   NuStep L2  x 6 min, seat 6, LE only      Supine  Heel slides AROM on board x 10- DNP 10/15  Heel slides AAROM on board with strap for OP x 10 - DNP 10/15  QS 3 sec hold x 20- prop 1/2 bolster- DNP 10/15  HS stretch 30\" x 3-  SLR 2 x 10 R/L   Ball flexion stretch x 20, 3 sec hold on    SAQ 20 R/L- #3      Seated:   LAQ x 20 , #3  Hamstring curls x 20 ea, Green TB (in envelope)     At high table or parallel bars: BUE    HR x 20 - eccentric focus   Toe raises x 20   Mini squats x 20   MIP x 20 ea  hip ABD 2 x 10 R/L   hip EXT 2 x 10 R/L  Carmela step overs  FWD/LAT x 5, alternated, 3 laps each, hit hurdles x 2 walking forward.  Step up to 6\" x 10 bilateral  Foam step tap 8\" x 20   - DNP  *used straight cane to navigate in department     HEP to be completed daily, exercises include:  AP  Heel slides  GS  QS  SLR  Hip ABD/ADD     Activity tolerance:  good    OP EDUCATION:  HEP    Assessment:  Pt's response to treatment:  good  Areas of improvements:  " ROM/strength/function  Limitations/deficits:  endurance    Pain end of session:   0/10    Plan:  Discharge- continue with HEP    Assessment of current progress against goals:  Functionally independent, goals met:  Date met 10/17 and Progressing toward functional goals- should continue to improve with HEP over time.      Goals:  STG(3 visits)  Patient to be independent with HEP- MET     LTG(12 visits)  WOMAC to < 20 % impaired-PROGRESSING  ROM right knee to 0-120- MET  MMT right knee/hip mm  4+/5- MET  4.   Patient to walk without device or deviation on level- PROGRESSING

## (undated) DEVICE — BLADE, MAKO, SAGITTAL, NARROW

## (undated) DEVICE — SUTURE, VICRYL, 1, 36 IN, CT-1, UNDYED

## (undated) DEVICE — STRIP, SKIN CLOSURE, STERI STRIP, REINFORCED, 0.5 X 4 IN

## (undated) DEVICE — SOLUTION, INJECTION, USP, LACTATED RINGERS, LIFECARE, 1000ML

## (undated) DEVICE — WOUND SYSTEM, DEBRIDEMENT & CLEANING, O.R DUOPAK

## (undated) DEVICE — ADHESIVE, SKIN, MASTISOL, 2/3 CC VIAL

## (undated) DEVICE — BLADE DUAL CUT SAGITTAL SAW STAINLESS STEEL 35 INCH X 64 INCH X .89 MM LATEX FREE REUSABLE

## (undated) DEVICE — PROTECTIVE, FOOT, FOAM PAD & COHESIVE WRAP, STERILE

## (undated) DEVICE — SOLUTION, CHLORHEXIDINE, 4%, 4OZ

## (undated) DEVICE — HOOD, SURGICAL, FLYTE HYBRID

## (undated) DEVICE — SYRINGE, 50 CC, LUER LOCK

## (undated) DEVICE — TRAY, DRY PREP, PREMIUM

## (undated) DEVICE — 10IN STRYKER SMOKE EVACUATION TUBING

## (undated) DEVICE — TIP, SUCTION, YANKAUER, FLEXIBLE

## (undated) DEVICE — CUFF, TOURNIQUET, 30 X 4, DUAL PORT/SNGL BLADDER, DISP, LF

## (undated) DEVICE — Device

## (undated) DEVICE — SUTURE, VICRYL, 2-0, 18 IN CP-2, UNDYED

## (undated) DEVICE — GLOVE, SURGICAL, BIOGEL, OPTIFIT, SZ 8.0

## (undated) DEVICE — SOLUTION, IRRIGATION, STERILE WATER, 1000 ML, POUR BOTTLE

## (undated) DEVICE — NEEDLE, SPINAL, S/SU, 18GA 3IN, QUINCKE, STERILE

## (undated) DEVICE — SUTURE, FIBERWIRE 2, T-5 TAPER NEEDLE, 38"

## (undated) DEVICE — TRACKING KIT, TM KNEE PROCEDURES, VIZADISC

## (undated) DEVICE — CHECKPOINT KIT, FEMORAL/ TIBIAL

## (undated) DEVICE — IRRIGATION SYSTEM, WOUND, PULSAVAC PLUS